# Patient Record
Sex: FEMALE | Race: WHITE | NOT HISPANIC OR LATINO | Employment: FULL TIME | URBAN - METROPOLITAN AREA
[De-identification: names, ages, dates, MRNs, and addresses within clinical notes are randomized per-mention and may not be internally consistent; named-entity substitution may affect disease eponyms.]

---

## 2018-04-13 ENCOUNTER — ANNUAL EXAM (OUTPATIENT)
Dept: OBGYN CLINIC | Facility: CLINIC | Age: 28
End: 2018-04-13
Payer: COMMERCIAL

## 2018-04-13 VITALS
BODY MASS INDEX: 28.82 KG/M2 | SYSTOLIC BLOOD PRESSURE: 138 MMHG | WEIGHT: 173 LBS | HEIGHT: 65 IN | DIASTOLIC BLOOD PRESSURE: 72 MMHG

## 2018-04-13 DIAGNOSIS — Z01.419 WOMEN'S ANNUAL ROUTINE GYNECOLOGICAL EXAMINATION: Primary | ICD-10-CM

## 2018-04-13 PROCEDURE — 99385 PREV VISIT NEW AGE 18-39: CPT | Performed by: NURSE PRACTITIONER

## 2018-04-13 NOTE — PROGRESS NOTES
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[employer]  Her menstrual cycles are regular and predictable  Her current method of contraception includes condoms  She denies issues with intimacy  She denies /GI and Gyn complaints  She denies depression/anxiety  There is a family hx of ovarian cancer in her MGM who passed away at the age of 48 and her Maternal Aunt who was also dx at age 48 and is doing well  Her dental care is up-to-date  Vaccines are up-to-date  She maintains a healthy lifestyle  Gynecologic History    Patient's last menstrual period was 2018  Last Pap: 2016  Results were: normal    Obstetric History    OB History    Para Term  AB Living   0 0 0 0 0 0   SAB TAB Ectopic Multiple Live Births   0 0 0 0 0             The following portions of the patient's history were reviewed and updated as appropriate: allergies, current medications, past family history, past medical history, past social history, past surgical history and problem list     Review of Systems    Pertinent items are noted in HPI  Objective    Physical Exam   Constitutional: Vital signs are normal  She appears well-developed and well-nourished  Genitourinary: Vagina normal and uterus normal  Pelvic exam was performed with patient supine  There is no rash, tenderness, lesion or Bartholin's cyst on the right labia  There is no rash, tenderness, lesion or Bartholin's cyst on the left labia  Right adnexum does not display mass, does not display tenderness and does not display fullness  Left adnexum does not display mass, does not display tenderness and does not display fullness  Cervix is nulliparous  Cervix does not exhibit motion tenderness, lesion, discharge, friability, polyp or nabothian cyst      Uterus is anteverted     HENT:   Head: Normocephalic and atraumatic  Neck: Neck supple  No thyromegaly present  Cardiovascular: Normal rate, regular rhythm, S1 normal, S2 normal and normal heart sounds  Pulmonary/Chest: Effort normal and breath sounds normal  Right breast exhibits no inverted nipple, no mass, no nipple discharge, no skin change and no tenderness  Left breast exhibits no inverted nipple, no mass, no nipple discharge, no skin change and no tenderness  Abdominal: Soft  Normal appearance and bowel sounds are normal  She exhibits no distension and no mass  There is no hepatosplenomegaly  There is no tenderness  Lymphadenopathy:     She has no cervical adenopathy  She has no axillary adenopathy  Neurological: She is alert  Skin: Skin is warm  Psychiatric: She has a normal mood and affect  Nursing note and vitals reviewed  Assessment and Plan    Patient informed of a Stable GYN exam  A pap smear was performed  Patient was informed if her pap is normal she will not receive a call from our office  All questions and concerns addressed and patient verbalized understanding  She is to follow up in 1 year or sooner if needed

## 2018-04-17 LAB
CLINICAL INFO: NORMAL
CYTO CVX: NORMAL
CYTOLOGY CMNT CVX/VAG CYTO-IMP: NORMAL
DATE PREVIOUS BX: NORMAL
LMP START DATE: NORMAL
SL AMB PREV. PAP:: NORMAL
SPECIMEN SOURCE CVX/VAG CYTO: NORMAL

## 2019-04-12 ENCOUNTER — ANNUAL EXAM (OUTPATIENT)
Dept: OBGYN CLINIC | Facility: CLINIC | Age: 29
End: 2019-04-12
Payer: COMMERCIAL

## 2019-04-12 VITALS
SYSTOLIC BLOOD PRESSURE: 120 MMHG | HEIGHT: 65 IN | BODY MASS INDEX: 27.66 KG/M2 | WEIGHT: 166 LBS | DIASTOLIC BLOOD PRESSURE: 74 MMHG

## 2019-04-12 DIAGNOSIS — R53.83 FATIGUE, UNSPECIFIED TYPE: ICD-10-CM

## 2019-04-12 DIAGNOSIS — L68.0 HIRSUTISM: ICD-10-CM

## 2019-04-12 DIAGNOSIS — Z01.419 WOMEN'S ANNUAL ROUTINE GYNECOLOGICAL EXAMINATION: Primary | ICD-10-CM

## 2019-04-12 PROCEDURE — 99395 PREV VISIT EST AGE 18-39: CPT | Performed by: NURSE PRACTITIONER

## 2019-04-12 RX ORDER — MULTIVITAMIN
1 TABLET ORAL DAILY
COMMUNITY
End: 2022-05-16 | Stop reason: ALTCHOICE

## 2019-04-18 ENCOUNTER — TELEPHONE (OUTPATIENT)
Dept: OBGYN CLINIC | Facility: CLINIC | Age: 29
End: 2019-04-18

## 2019-04-18 LAB
17OHP SERPL-MCNC: 150 NG/DL
25(OH)D3 SERPL-MCNC: 39 NG/ML (ref 30–100)
CHOLEST SERPL-MCNC: 164 MG/DL
CHOLEST/HDLC SERPL: 2.6 (CALC)
DHEA-S SERPL-MCNC: 172 MCG/DL (ref 18–391)
GLUCOSE 2H P MEAL SERPL-MCNC: 84 MG/DL
GLUCOSE P FAST SERPL-MCNC: 82 MG/DL (ref 65–99)
HDLC SERPL-MCNC: 63 MG/DL
INSULIN SERPL-ACNC: 4.6 UIU/ML (ref 2–19.6)
LDLC SERPL CALC-MCNC: 87 MG/DL (CALC)
NONHDLC SERPL-MCNC: 101 MG/DL (CALC)
PROLACTIN SERPL-MCNC: 13.4 NG/ML
TESTOST FREE SERPL-MCNC: 5.6 PG/ML (ref 0.1–6.4)
TESTOST SERPL-MCNC: 54 NG/DL (ref 2–45)
TRIGL SERPL-MCNC: 59 MG/DL
TSH SERPL-ACNC: 1.89 MIU/L

## 2019-04-19 ENCOUNTER — TELEPHONE (OUTPATIENT)
Dept: OBGYN CLINIC | Facility: CLINIC | Age: 29
End: 2019-04-19

## 2019-10-23 ENCOUNTER — OFFICE VISIT (OUTPATIENT)
Dept: INTERNAL MEDICINE CLINIC | Facility: CLINIC | Age: 29
End: 2019-10-23
Payer: COMMERCIAL

## 2019-10-23 VITALS
TEMPERATURE: 98.6 F | BODY MASS INDEX: 30.66 KG/M2 | DIASTOLIC BLOOD PRESSURE: 72 MMHG | HEIGHT: 65 IN | WEIGHT: 184 LBS | RESPIRATION RATE: 16 BRPM | SYSTOLIC BLOOD PRESSURE: 126 MMHG | HEART RATE: 64 BPM | OXYGEN SATURATION: 99 %

## 2019-10-23 DIAGNOSIS — Z23 NEED FOR INFLUENZA VACCINATION: Primary | ICD-10-CM

## 2019-10-23 DIAGNOSIS — Z00.00 ENCOUNTER FOR WELL ADULT EXAM WITHOUT ABNORMAL FINDINGS: ICD-10-CM

## 2019-10-23 DIAGNOSIS — J30.2 SEASONAL ALLERGIES: ICD-10-CM

## 2019-10-23 LAB
BUN SERPL-MCNC: 11 MG/DL (ref 7–25)
BUN/CREAT SERPL: NORMAL (CALC) (ref 6–22)
CALCIUM SERPL-MCNC: 9.6 MG/DL (ref 8.6–10.2)
CHLORIDE SERPL-SCNC: 103 MMOL/L (ref 98–110)
CHOLEST SERPL-MCNC: 182 MG/DL
CHOLEST/HDLC SERPL: 3 (CALC)
CO2 SERPL-SCNC: 28 MMOL/L (ref 20–32)
CREAT SERPL-MCNC: 0.75 MG/DL (ref 0.5–1.1)
GLUCOSE SERPL-MCNC: 91 MG/DL (ref 65–99)
HDLC SERPL-MCNC: 60 MG/DL
LDLC SERPL CALC-MCNC: 107 MG/DL (CALC)
NONHDLC SERPL-MCNC: 122 MG/DL (CALC)
POTASSIUM SERPL-SCNC: 4.1 MMOL/L (ref 3.5–5.3)
SL AMB EGFR AFRICAN AMERICAN: 127 ML/MIN/1.73M2
SL AMB EGFR NON AFRICAN AMERICAN: 109 ML/MIN/1.73M2
SODIUM SERPL-SCNC: 137 MMOL/L (ref 135–146)
TRIGL SERPL-MCNC: 61 MG/DL

## 2019-10-23 PROCEDURE — 99385 PREV VISIT NEW AGE 18-39: CPT | Performed by: INTERNAL MEDICINE

## 2019-10-23 RX ORDER — FEXOFENADINE HCL 180 MG/1
180 TABLET ORAL DAILY
COMMUNITY
End: 2022-05-16

## 2019-10-23 NOTE — PROGRESS NOTES
Body mass index is 30 39 kg/m²  Assessment/Plan:    Encounter for well adult exam without abnormal findings  Pt is here to establish care and perform a physical for her new job  ROS and physical exam findings were normal  A lipid panel and basic metabolic panel were ordered  We will contact the patient once the forms are completed  BMI 30 0-30 9,adult  Will need follow-up regarding dietary recommendations and need to increase physical activity for weight control    Seasonal allergies  Currently stable with over-the-counter medication  Diagnoses and all orders for this visit:    Need for influenza vaccination  -     influenza vaccine, [id number], quadrivalent, 0 5 mL, preservative-free, for adult and pediatric patients 6 mos+ (AFLURIA, FLUARIX, FLULAVAL, FLUZONE)  -     Basic metabolic panel; Future  -     Lipid panel; Future    BMI 30 0-30 9,adult    Encounter for well adult exam without abnormal findings    Seasonal allergies    Other orders  -     Probiotic Product (PROBIOTIC DAILY PO); Take 1 tablet by mouth daily as needed  -     fexofenadine (ALLEGRA) 180 MG tablet; Take 180 mg by mouth daily               Subjective:   Pt is a 33 yo young woman here to establish care and fill out forms for her new job  She has no complaints at the moment  Patient ID: Chandler Molina is a 34 y o  female  Pt is a 33 yo female who came to establish care and to fill out a physical form for her new job  She appears well and has no complaints  She does have some congestion from her seasonal allergies but they are well controlled with allegra  She visits her gynecologist regularly and all was well in her last visit  A lipid panel and basic metabolic panel were ordered for her physical form  Pt will be contacted when the form is filled         Family History   Problem Relation Age of Onset    Anxiety disorder Mother     Hypertension Mother     Depression Mother     Hypertension Father     Gout Father  Ovarian cancer Maternal Grandmother          at 48    Cancer Maternal Grandmother     Ovarian cancer Maternal Aunt 48    Esophageal cancer Maternal Grandfather      Social History     Socioeconomic History    Marital status: Single     Spouse name: Not on file    Number of children: Not on file    Years of education: Not on file    Highest education level: Not on file   Occupational History    Not on file   Social Needs    Financial resource strain: Not on file    Food insecurity:     Worry: Not on file     Inability: Not on file    Transportation needs:     Medical: Not on file     Non-medical: Not on file   Tobacco Use    Smoking status: Never Smoker    Smokeless tobacco: Never Used   Substance and Sexual Activity    Alcohol use:  Yes     Alcohol/week: 2 0 standard drinks     Types: 1 Glasses of wine, 1 Cans of beer per week     Frequency: 2-4 times a month     Drinks per session: 1 or 2     Binge frequency: Never     Comment: social    Drug use: No    Sexual activity: Yes     Partners: Male     Birth control/protection: Condom Male   Lifestyle    Physical activity:     Days per week: Not on file     Minutes per session: Not on file    Stress: Not on file   Relationships    Social connections:     Talks on phone: Not on file     Gets together: Not on file     Attends Baptism service: Not on file     Active member of club or organization: Not on file     Attends meetings of clubs or organizations: Not on file     Relationship status: Not on file    Intimate partner violence:     Fear of current or ex partner: Not on file     Emotionally abused: Not on file     Physically abused: Not on file     Forced sexual activity: Not on file   Other Topics Concern    Not on file   Social History Narrative    Not on file     Past Medical History:   Diagnosis Date    Acne     Allergic     seasonal       Current Outpatient Medications:     fexofenadine (ALLEGRA) 180 MG tablet, Take 180 mg by mouth daily, Disp: , Rfl:     Multiple Vitamin (MULTIVITAMIN) tablet, Take 1 tablet by mouth daily, Disp: , Rfl:     Probiotic Product (PROBIOTIC DAILY PO), Take 1 tablet by mouth daily as needed, Disp: , Rfl:   No Known Allergies  Past Surgical History:   Procedure Laterality Date    WISDOM TOOTH EXTRACTION      WISDOM TOOTH EXTRACTION           Review of Systems   Constitutional: Negative for chills, diaphoresis, fatigue, fever and unexpected weight change  HENT: Positive for congestion  Negative for ear discharge, ear pain, facial swelling, hearing loss, postnasal drip, rhinorrhea, sinus pressure, sinus pain, sneezing, sore throat, tinnitus and trouble swallowing  Pt has congestion from seasonal allergies, which she takes allegra   Eyes: Negative for photophobia, pain, discharge, redness, itching and visual disturbance  Respiratory: Negative for cough, chest tightness and shortness of breath  Cardiovascular: Negative for chest pain, palpitations and leg swelling  Gastrointestinal: Positive for constipation  Negative for abdominal distention, abdominal pain, blood in stool, diarrhea, nausea and vomiting  Pt takes probiotics for her constipation, which provides relief   Endocrine: Negative for cold intolerance and heat intolerance  Genitourinary: Negative for decreased urine volume, difficulty urinating, dysuria, flank pain, frequency, hematuria, menstrual problem, pelvic pain, urgency, vaginal bleeding, vaginal discharge and vaginal pain  Musculoskeletal: Negative for arthralgias, back pain, gait problem, joint swelling and myalgias  Skin: Negative for color change  Allergic/Immunologic: Positive for environmental allergies  Negative for food allergies  Pt has seasonal allergies and is allergic to cats   Neurological: Negative for dizziness, tremors, seizures, syncope, weakness, light-headedness and headaches     Psychiatric/Behavioral: Negative for agitation and behavioral problems  Past Medical History:   Diagnosis Date    Acne     Allergic     seasonal         Current Outpatient Medications:     fexofenadine (ALLEGRA) 180 MG tablet, Take 180 mg by mouth daily, Disp: , Rfl:     Multiple Vitamin (MULTIVITAMIN) tablet, Take 1 tablet by mouth daily, Disp: , Rfl:     Probiotic Product (PROBIOTIC DAILY PO), Take 1 tablet by mouth daily as needed, Disp: , Rfl:     No Known Allergies    Social History   Past Surgical History:   Procedure Laterality Date    WISDOM TOOTH EXTRACTION      WISDOM TOOTH EXTRACTION       Family History   Problem Relation Age of Onset    Anxiety disorder Mother     Hypertension Mother     Depression Mother     Hypertension Father     Gout Father     Ovarian cancer Maternal Grandmother          at 48    Cancer Maternal Grandmother     Ovarian cancer Maternal Aunt 48    Esophageal cancer Maternal Grandfather        Objective:  /72 (BP Location: Left arm, Patient Position: Sitting, Cuff Size: Adult)   Pulse 64   Temp 98 6 °F (37 °C) (Oral)   Resp 16   Ht 5' 5 25" (1 657 m)   Wt 83 5 kg (184 lb)   SpO2 99% Comment: Room air  BMI 30 39 kg/m²   Body mass index is 30 39 kg/m²  BMI Counseling: Body mass index is 30 39 kg/m²  The BMI is above normal  Nutrition recommendations include reducing portion sizes, decreasing overall calorie intake, 3-5 servings of fruits/vegetables daily, reducing fast food intake, consuming healthier snacks, decreasing soda and/or juice intake, moderation in carbohydrate intake, increasing intake of lean protein, reducing intake of saturated fat and trans fat and reducing intake of cholesterol  Exercise recommendations include exercising 3-5 times per week and joining a gym  Physical Exam   Constitutional: She is oriented to person, place, and time  She appears well-developed and well-nourished  No distress  HENT:   Head: Normocephalic     Eyes: Pupils are equal, round, and reactive to light  Right eye exhibits no discharge  Left eye exhibits no discharge  No scleral icterus  Neck: Normal range of motion  Cardiovascular: Normal rate, regular rhythm, normal heart sounds and intact distal pulses  Pulmonary/Chest: Effort normal and breath sounds normal    Abdominal: Soft  Bowel sounds are normal    Musculoskeletal: Normal range of motion  Neurological: She is alert and oriented to person, place, and time  Skin: Skin is warm  She is not diaphoretic  Psychiatric: She has a normal mood and affect   Her behavior is normal

## 2019-10-23 NOTE — ASSESSMENT & PLAN NOTE
Will need follow-up regarding dietary recommendations and need to increase physical activity for weight control

## 2019-10-23 NOTE — ASSESSMENT & PLAN NOTE
Pt is here to establish care and perform a physical for her new job  ROS and physical exam findings were normal  A lipid panel and basic metabolic panel were ordered  We will contact the patient once the forms are completed

## 2019-10-30 ENCOUNTER — TELEPHONE (OUTPATIENT)
Dept: INTERNAL MEDICINE CLINIC | Facility: CLINIC | Age: 29
End: 2019-10-30

## 2019-10-30 NOTE — TELEPHONE ENCOUNTER
Pt had blood work done and left a form to be filled out once the blood work did come in titled Healthy blue reward  Are we able to look into this and then give the pt a call back with the results?

## 2020-06-15 ENCOUNTER — ANNUAL EXAM (OUTPATIENT)
Dept: OBGYN CLINIC | Facility: CLINIC | Age: 30
End: 2020-06-15
Payer: COMMERCIAL

## 2020-06-15 VITALS
WEIGHT: 183 LBS | HEIGHT: 65 IN | BODY MASS INDEX: 30.49 KG/M2 | DIASTOLIC BLOOD PRESSURE: 74 MMHG | SYSTOLIC BLOOD PRESSURE: 122 MMHG

## 2020-06-15 DIAGNOSIS — Z01.419 WOMEN'S ANNUAL ROUTINE GYNECOLOGICAL EXAMINATION: Primary | ICD-10-CM

## 2020-06-15 PROCEDURE — S0612 ANNUAL GYNECOLOGICAL EXAMINA: HCPCS | Performed by: NURSE PRACTITIONER

## 2020-12-18 ENCOUNTER — TELEPHONE (OUTPATIENT)
Dept: OBGYN CLINIC | Facility: CLINIC | Age: 30
End: 2020-12-18

## 2021-01-04 ENCOUNTER — INITIAL PRENATAL (OUTPATIENT)
Dept: OBGYN CLINIC | Facility: CLINIC | Age: 31
End: 2021-01-04

## 2021-01-04 VITALS
HEIGHT: 65 IN | BODY MASS INDEX: 31.09 KG/M2 | SYSTOLIC BLOOD PRESSURE: 134 MMHG | WEIGHT: 186.6 LBS | DIASTOLIC BLOOD PRESSURE: 70 MMHG

## 2021-01-04 DIAGNOSIS — Z34.01 ENCOUNTER FOR SUPERVISION OF NORMAL FIRST PREGNANCY IN FIRST TRIMESTER: Primary | ICD-10-CM

## 2021-01-04 DIAGNOSIS — Z36.89 ENCOUNTER FOR OTHER SPECIFIED ANTENATAL SCREENING: ICD-10-CM

## 2021-01-04 DIAGNOSIS — Z3A.08 8 WEEKS GESTATION OF PREGNANCY: ICD-10-CM

## 2021-01-04 PROCEDURE — OBC: Performed by: OBSTETRICS & GYNECOLOGY

## 2021-01-04 NOTE — PROGRESS NOTES
INITIAL PRENATAL NURSE APPT:      Unplanned pregnancy - no birth control x 1 1/2 yrs  Taking prenatal vits w/ dha  LMP 2020 - 8 3/7 wk - [facility] 2021  Cycles q 34-36 days  Had yearly 2020 - last pap 2018  No travel plans  Pt had flu vaccine 10/2020  No hx MRSA  Pt works as physical therapist (long-term care)  Undecided about getting COVID vaccine - recom per CDC  Discussed nutrition (will increase dietary calcium & take supp), SQS testing, CF testing, Level 2 U/S, TDAP @ 28 wk  (+) nausea, no vomiting, (+) urinary frequency  Initial prenatal labs + 1 hr glucose (increased BMI) ordered ([facility])

## 2021-01-11 LAB
ABO GROUP BLD: NORMAL
APPEARANCE UR: CLEAR
BACTERIA UR QL AUTO: NORMAL /HPF
BASOPHILS # BLD AUTO: 50 CELLS/UL (ref 0–200)
BASOPHILS NFR BLD AUTO: 0.7 %
BILIRUB UR QL STRIP: NEGATIVE
BLD GP AB SCN SERPL QL: NORMAL
COLOR UR: YELLOW
EOSINOPHIL # BLD AUTO: 58 CELLS/UL (ref 15–500)
EOSINOPHIL NFR BLD AUTO: 0.8 %
ERYTHROCYTE [DISTWIDTH] IN BLOOD BY AUTOMATED COUNT: 12.5 % (ref 11–15)
GLUCOSE 1H P 50 G GLC PO SERPL-MCNC: 77 MG/DL
GLUCOSE UR QL STRIP: NEGATIVE
HBV SURFACE AG SERPL QL IA: NORMAL
HCT VFR BLD AUTO: 38.2 % (ref 35–45)
HGB BLD-MCNC: 12.6 G/DL (ref 11.7–15.5)
HGB UR QL STRIP: NEGATIVE
HIV 1+2 AB+HIV1 P24 AG SERPL QL IA: NORMAL
HYALINE CASTS #/AREA URNS LPF: NORMAL /LPF
KETONES UR QL STRIP: NEGATIVE
LEUKOCYTE ESTERASE UR QL STRIP: NEGATIVE
LYMPHOCYTES # BLD AUTO: 1332 CELLS/UL (ref 850–3900)
LYMPHOCYTES NFR BLD AUTO: 18.5 %
MCH RBC QN AUTO: 30.5 PG (ref 27–33)
MCHC RBC AUTO-ENTMCNC: 33 G/DL (ref 32–36)
MCV RBC AUTO: 92.5 FL (ref 80–100)
MONOCYTES # BLD AUTO: 734 CELLS/UL (ref 200–950)
MONOCYTES NFR BLD AUTO: 10.2 %
NEUTROPHILS # BLD AUTO: 5026 CELLS/UL (ref 1500–7800)
NEUTROPHILS NFR BLD AUTO: 69.8 %
NITRITE UR QL STRIP: NEGATIVE
PH UR STRIP: 7.5 [PH] (ref 5–8)
PLATELET # BLD AUTO: 300 THOUSAND/UL (ref 140–400)
PMV BLD REES-ECKER: 9.8 FL (ref 7.5–12.5)
PROT UR QL STRIP: NEGATIVE
RBC # BLD AUTO: 4.13 MILLION/UL (ref 3.8–5.1)
RBC #/AREA URNS HPF: NORMAL /HPF
RH BLD: NORMAL
RPR SER QL: NORMAL
RUBV IGG SERPL IA-ACNC: 1.9 INDEX
SP GR UR STRIP: 1.01 (ref 1–1.03)
SQUAMOUS #/AREA URNS HPF: NORMAL /HPF
WBC # BLD AUTO: 7.2 THOUSAND/UL (ref 3.8–10.8)
WBC #/AREA URNS HPF: NORMAL /HPF

## 2021-01-18 ENCOUNTER — ROUTINE PRENATAL (OUTPATIENT)
Dept: OBGYN CLINIC | Facility: CLINIC | Age: 31
End: 2021-01-18

## 2021-01-18 VITALS
HEIGHT: 65 IN | BODY MASS INDEX: 31.32 KG/M2 | WEIGHT: 188 LBS | DIASTOLIC BLOOD PRESSURE: 78 MMHG | SYSTOLIC BLOOD PRESSURE: 124 MMHG

## 2021-01-18 DIAGNOSIS — Z36.89 ENCOUNTER FOR OTHER SPECIFIED ANTENATAL SCREENING: Primary | ICD-10-CM

## 2021-01-18 PROCEDURE — 87070 CULTURE OTHR SPECIMN AEROBIC: CPT | Performed by: NURSE PRACTITIONER

## 2021-01-18 PROCEDURE — G0145 SCR C/V CYTO,THINLAYER,RESCR: HCPCS | Performed by: NURSE PRACTITIONER

## 2021-01-18 PROCEDURE — 87491 CHLMYD TRACH DNA AMP PROBE: CPT | Performed by: NURSE PRACTITIONER

## 2021-01-18 PROCEDURE — PNV: Performed by: NURSE PRACTITIONER

## 2021-01-18 PROCEDURE — 87591 N.GONORRHOEAE DNA AMP PROB: CPT | Performed by: NURSE PRACTITIONER

## 2021-01-18 NOTE — PROGRESS NOTES
Assessment/Plan:       Diagnoses and all orders for this visit:    Encounter for other specified  screening  -     Liquid-based pap, screening  -     Chlamydia/GC amplified DNA by PCR  -     Genital Comprehensive Culture          RTO in 4 weeks  Subjective:      Patient ID: Darion Calderon is a 31 y o  female   Here for OB physical  This was an unplanned pregnancy  HPI    The following portions of the patient's history were reviewed and updated as appropriate: allergies, current medications, past family history, past medical history, past social history, past surgical history and problem list     Review of Systems      Objective:      /78   Ht 5' 5" (1 651 m)   Wt 85 3 kg (188 lb)   LMP 2020   BMI 31 28 kg/m²        Physical Exam      See OB physical under episode  Prenatal course of care reviewed with patient  Transabdominal US confirms a viable IUP measuring  9 weeks  Size less than dates  ROSAURA based on US - 21  Patient will arrange for genetic screening with  center  Initial prenatal labs reviewed  Pap smear and cultures obtained  JSW in for delivery consent signature  All questions and concerns addressed and patient verbalized understanding

## 2021-01-21 LAB
LAB AP GYN PRIMARY INTERPRETATION: NORMAL
LAB AP LMP: NORMAL
Lab: NORMAL

## 2021-01-22 LAB — BACTERIA GENITAL AEROBE CULT: NORMAL

## 2021-01-25 LAB
C TRACH DNA SPEC QL NAA+PROBE: NEGATIVE
N GONORRHOEA DNA SPEC QL NAA+PROBE: NEGATIVE

## 2021-02-05 ENCOUNTER — TELEPHONE (OUTPATIENT)
Dept: INTERNAL MEDICINE CLINIC | Facility: CLINIC | Age: 31
End: 2021-02-05

## 2021-02-05 ENCOUNTER — TELEPHONE (OUTPATIENT)
Dept: PERINATAL CARE | Facility: CLINIC | Age: 31
End: 2021-02-05

## 2021-02-05 NOTE — TELEPHONE ENCOUNTER
-------------------------------------------------------------    Attempted to reach patient by phone and left voicemail to confirm appointment for MFM ultrasound  1 support person ( must be over the age of 15) may accompany you for your appointment  If you or your support person have traveled outside the state in the past 2 weeks, please call and notify our office today #[phone]  You and your support person must wear a mask ,covering nose and mouth,during your entire visit  To minimize your exposure in our waiting room, please call our office prior to entering the building  Check in and rooming questions will be done via phone  We will give you directions when to enter for your appointment  Radha: [phone]    IF you are not feeling well- cough, fever, shortness of breath or any flu like symptoms, contact your primary care physician or [facility]  If you are awaiting COVID 19 test results please call and reschedule your appointment    Any questions with these instructions please call Maternal Fetal Medicine nurse line today @ # [phone]

## 2021-02-05 NOTE — TELEPHONE ENCOUNTER
Left message on machine for patient to call me at [place] office  patient seen once for a physical 10/23/19      Pt overdue for another physical with Dr Veronica Hernandez

## 2021-02-08 ENCOUNTER — ROUTINE PRENATAL (OUTPATIENT)
Dept: PERINATAL CARE | Facility: CLINIC | Age: 31
End: 2021-02-08
Payer: COMMERCIAL

## 2021-02-08 VITALS
HEART RATE: 80 BPM | HEIGHT: 65 IN | BODY MASS INDEX: 31.82 KG/M2 | DIASTOLIC BLOOD PRESSURE: 70 MMHG | SYSTOLIC BLOOD PRESSURE: 125 MMHG | WEIGHT: 191 LBS

## 2021-02-08 DIAGNOSIS — Z36.82 ENCOUNTER FOR ANTENATAL SCREENING FOR NUCHAL TRANSLUCENCY: Primary | ICD-10-CM

## 2021-02-08 DIAGNOSIS — Z3A.12 12 WEEKS GESTATION OF PREGNANCY: ICD-10-CM

## 2021-02-08 PROCEDURE — 76813 OB US NUCHAL MEAS 1 GEST: CPT | Performed by: OBSTETRICS & GYNECOLOGY

## 2021-02-08 PROCEDURE — 99203 OFFICE O/P NEW LOW 30 MIN: CPT | Performed by: OBSTETRICS & GYNECOLOGY

## 2021-02-08 PROCEDURE — 1036F TOBACCO NON-USER: CPT | Performed by: OBSTETRICS & GYNECOLOGY

## 2021-02-08 RX ORDER — ASPIRIN 81 MG/1
162 TABLET ORAL DAILY
Qty: 180 TABLET | Refills: 0 | Status: SHIPPED | OUTPATIENT
Start: 2021-02-08 | End: 2022-05-16 | Stop reason: ALTCHOICE

## 2021-02-08 NOTE — PATIENT INSTRUCTIONS
Thank you for choosing us for your  care today  If you have any questions about your ultrasound or care, please do not hesitate to contact us or your primary obstetrician  Please begin taking aspirin 162mg daily (two of the 81mg tablets) for the prevention of preeclampsia  If you have asthma and notice an increase in symptom frequency or severity, consider stopping this medication  Some general instructions for your pregnancy are:     Protect against coronavirus: Pregnant women are increased risk of severe COVID  Continue to practice social distancing, wear a mask, and wash your hands often  Because of the increased risk of pregnancy, you are advised to not attend or host inperson gatherings with people who do not currently live inside your household - this includes birthday parties, gender reveals, baby showers, etc)  Notify your primary care doctor if you have any symptoms including cough, shortness of breath or difficulty breathing, fever, chills, muscle pain, sore throat, or loss of taste or smell  Pregnant women can receive the coronavirus vaccine   Exercise: Aim for 22 minutes per day (150 minutes per week) of regular exercise  Walking is great!  Nutrition: aim for calcium-rich and iron-rich foods as well as healthy sources of protein   Protect against the flu: get yourself and your entire household vaccinated against influenza  This will protect your baby   Learn about Preeclampsia: preeclampsia is a common, serious high blood pressure complication in pregnancy  A blood pressure of 086EAJB (systolic or top number) or 47OETS (diastolic or bottom number) is not normal and needs evaluation by your doctor   If you smoke, try to reduce how many cigarettes you smoke or quit completely  Do not vape       Other warning signs to watch out for in pregnancy or postpartum: chest pain, obstructed breathing or shortness of breath, seizures, thoughts of hurting yourself or your baby, bleeding, a painful or swollen leg, fever, or headache (see AWHONN POST-BIRTH Warning Signs campaign)  If these happen call 911  Itching is also not normal in pregnancy and if you experience this, especially over your hands and feet, potentially worse at night, notify your doctors   Lastly, if you are contacted regarding participation in a survey about your experience in our office, please know that we take any feedback you provide seriously and use it to improve how we deliver care through our center

## 2021-02-08 NOTE — PROGRESS NOTES
[address]: Ms Cosme Abernathy was seen today at 12w0d for nuchal translucency ultrasound  See ultrasound report under "OB Procedures" tab  Please don't hesitate to contact our office with any concerns or questions    Fiorella Diaz MD

## 2021-02-08 NOTE — LETTER
February 8, 2021     Missy Méndez MD  [address]  [address]  [address] [place] [address]    Patient: Kasandra Felton   YOB: 1990   Date of Visit: 2/8/2021       Dear Dr Jareth Guevara: Thank you for referring Wilfrid Aleman to me for evaluation  Below are my notes for this consultation  If you have questions, please do not hesitate to call me  I look forward to following your patient along with you  Sincerely,        Nubia Machado MD        CC: No Recipients  Nubia Machado MD  2/8/2021  7:55 AM  Sign when Signing Visit  [id number] [place] [address] Road: Ms Bhumika Hare was seen today at 12w0d for nuchal translucency ultrasound  See ultrasound report under "OB Procedures" tab  Please don't hesitate to contact our office with any concerns or questions    Nubia Machado MD

## 2021-02-08 NOTE — PROGRESS NOTES
Pt chose to have Step wise test done, I gave her the form with explanation which she verbalized understandings

## 2021-02-15 ENCOUNTER — ROUTINE PRENATAL (OUTPATIENT)
Dept: OBGYN CLINIC | Facility: CLINIC | Age: 31
End: 2021-02-15

## 2021-02-15 VITALS — BODY MASS INDEX: 31.48 KG/M2 | WEIGHT: 189.2 LBS | SYSTOLIC BLOOD PRESSURE: 110 MMHG | DIASTOLIC BLOOD PRESSURE: 70 MMHG

## 2021-02-15 DIAGNOSIS — Z34.81 PRENATAL CARE, SUBSEQUENT PREGNANCY, FIRST TRIMESTER: Primary | ICD-10-CM

## 2021-02-15 PROCEDURE — PNV: Performed by: OBSTETRICS & GYNECOLOGY

## 2021-02-15 NOTE — PROGRESS NOTES
The patient had her lab work drawn for sequential screening test today, results are pending  No other concerns or issues today

## 2021-02-15 NOTE — PATIENT INSTRUCTIONS
No issues or complaints lab work drawn today for the 1st part sequential screening test   Will keep her EDC  Return to office 4 weeks

## 2021-02-18 LAB
# FETUSES US: 1
AGE: NORMAL
B-HCG ADJ MOM SERPL: 1.88
B-HCG SERPL-ACNC: 117.3 IU/ML
COLLECT DATE: NORMAL
CURRENT SMOKER: NORMAL
DONATED EGG PATIENT QL: NORMAL
FET CRL US.MEAS: 67 MM
FET CRL US.MEAS: NORMAL MM
FET NUCHAL FOLD MOM THICKNESS US.MEAS: 0.89
FET NUCHAL FOLD THICKNESS US.MEAS: 1.4 MM
FET NUCHAL FOLD THICKNESS US.MEAS: NORMAL MM
FET TS 21 RISK FROM MAT AGE: NORMAL
GA CLIN EST: 13.9 WK
GA METHOD: NORMAL
HX OF NTD NARR: NORMAL
HX OF TRISOMY 21 NARR: NORMAL
IDDM PATIENT QL: NORMAL
INTEGRATED SCN PATIENT-IMP: NORMAL
PAPP-A MOM SERPL: 1.03
PAPP-A SERPL-MCNC: 1264.2 NG/ML
PHYSICIAN NPI: NORMAL
SL AMB NASAL BONE: PRESENT
SL AMB NTQR LOCATION ID#: NORMAL
SL AMB NTQR READING PHYS ID#: NORMAL
SL AMB REFERRING PHYSICIAN NAME: NORMAL
SL AMB REFERRING PHYSICIAN PHONE: NORMAL
SL AMB REPEAT SPECIMEN: NORMAL
SL AMB TWIN B NASAL BONE: NORMAL
SONOGRAPHER NAME: NORMAL
SONOGRAPHER: NORMAL
SONOGRAPHER: NORMAL
TS 18 RISK FETUS: NORMAL
TS 21 RISK FETUS: NORMAL
US DATE: NORMAL
US FETUSES STUDY [IMP]: NORMAL

## 2021-02-19 ENCOUNTER — TELEPHONE (OUTPATIENT)
Dept: PERINATAL CARE | Facility: OTHER | Age: 31
End: 2021-02-19

## 2021-02-19 NOTE — TELEPHONE ENCOUNTER
----- Message from Crystal Lucero MD sent at 2021  8:13 AM EST -----  Hi  RN staff, I've reviewed this Sequential Part 1 result which shows low preliminary risk for the conditions tested (trisomies 21 and 18), can you call her to inform her of this result? Please also mail her the requisition form for the Sequential Screen Part 2  Thank you    Crystal Lucero MD

## 2021-02-19 NOTE — LETTER
02/19/21  Cesar Page  1990    Thank you for completing Part 1 of your Sequential Screen  To obtain a complete test result, please complete blood work for Part 2 Sequential Screen between the weeks of 2/23 to 3/08, 2021 (BEST RESULT)                                                                                                 Last day 4/19/2021    Based on your insurance coverage, please use one of the following locations  The other option is to go to [email or web address]  Call our office for any questions at [phone]          Sincerely,    Jeannine Velez RN

## 2021-03-10 LAB
# FETUSES US: 1
AFP ADJ MOM SERPL: 1.23
AFP SERPL-MCNC: 37.4 NG/ML
B-HCG ADJ MOM SERPL: 1.94
B-HCG SERPL-ACNC: 58.7 IU/ML
COLLECT DATE: NORMAL
CURRENT SMOKER: NORMAL
FET CRL US.MEAS: 67 MM
FET NUCHAL FOLD MOM THICKNESS US.MEAS: 0.89
FET NUCHAL FOLD THICKNESS US.MEAS: 1.4 MM
FET TS 21 RISK FROM MAT AGE: NORMAL
GA CLIN EST: 16.3 WK
HX OF NTD NARR: NORMAL
IDDM PATIENT QL: NORMAL
INHIBIN A ADJ MOM SERPL: 1.05
INHIBIN A SERPL-MCNC: 154 PG/ML
INTEGRATED SCN PATIENT-IMP: NORMAL
NEURAL TUBE DEFECT RISK FETUS: NORMAL %
PAPP-A MOM SERPL: 1.03
PAPP-A SERPL-MCNC: 1264.2 NG/ML
PHYSICIAN NPI: NORMAL
SL AMB NASAL BONE: PRESENT
SL AMB REFERRING PHYSICIAN NAME: NORMAL
SL AMB REFERRING PHYSICIAN PHONE: NORMAL
SL AMB REPEAT SPECIMEN: NORMAL
SL AMB SPECIMEN # FROM PART 1: NORMAL
SL AMB TWIN B NASAL BONE: NORMAL
TS 18 RISK FETUS: NORMAL
TS 21 RISK FETUS: NORMAL
U ESTRIOL ADJ MOM SERPL: 1.18
U ESTRIOL SERPL-MCNC: 0.85 NG/ML
US DATE: NORMAL

## 2021-03-11 ENCOUNTER — TELEPHONE (OUTPATIENT)
Dept: PERINATAL CARE | Facility: OTHER | Age: 31
End: 2021-03-11

## 2021-03-11 NOTE — TELEPHONE ENCOUNTER
Called patient and gave her the results of her part 2 sequential screen  She offers no questions or concerns at this time

## 2021-03-11 NOTE — TELEPHONE ENCOUNTER
----- Message from Hui Navarro MD sent at 3/11/2021  2:42 PM EST -----  Hi  RN staff, I've reviewed this Sequential Part 2 result which shows low residual risk for the conditions tested (trisomies 21 and 18 and open neural tube defects), can you call her to inform her of this result? Thank you    Hui Navarro MD

## 2021-03-15 ENCOUNTER — ROUTINE PRENATAL (OUTPATIENT)
Dept: OBGYN CLINIC | Facility: CLINIC | Age: 31
End: 2021-03-15

## 2021-03-15 VITALS
WEIGHT: 192 LBS | DIASTOLIC BLOOD PRESSURE: 74 MMHG | SYSTOLIC BLOOD PRESSURE: 116 MMHG | HEIGHT: 65 IN | BODY MASS INDEX: 31.99 KG/M2

## 2021-03-15 DIAGNOSIS — Z34.02 ENCOUNTER FOR SUPERVISION OF NORMAL FIRST PREGNANCY IN SECOND TRIMESTER: Primary | ICD-10-CM

## 2021-03-15 PROCEDURE — PNV: Performed by: NURSE PRACTITIONER

## 2021-03-15 NOTE — PROGRESS NOTES
Moe Mchugh is doing well  Denies LOF/Bleeding/Cramping  Feeling flutters occasionally  Genetic screening negative  RTO in 4 weeks

## 2021-03-29 ENCOUNTER — ROUTINE PRENATAL (OUTPATIENT)
Dept: PERINATAL CARE | Facility: CLINIC | Age: 31
End: 2021-03-29
Payer: COMMERCIAL

## 2021-03-29 ENCOUNTER — TELEPHONE (OUTPATIENT)
Dept: OBGYN CLINIC | Facility: CLINIC | Age: 31
End: 2021-03-29

## 2021-03-29 VITALS
HEIGHT: 65 IN | WEIGHT: 193.6 LBS | SYSTOLIC BLOOD PRESSURE: 127 MMHG | BODY MASS INDEX: 32.26 KG/M2 | HEART RATE: 86 BPM | DIASTOLIC BLOOD PRESSURE: 60 MMHG

## 2021-03-29 DIAGNOSIS — Z36.3 ENCOUNTER FOR ANTENATAL SCREENING FOR MALFORMATIONS: Primary | ICD-10-CM

## 2021-03-29 DIAGNOSIS — Z36.86 ENCOUNTER FOR ANTENATAL SCREENING FOR CERVICAL LENGTH: ICD-10-CM

## 2021-03-29 PROCEDURE — 76817 TRANSVAGINAL US OBSTETRIC: CPT | Performed by: OBSTETRICS & GYNECOLOGY

## 2021-03-29 PROCEDURE — 3008F BODY MASS INDEX DOCD: CPT | Performed by: OBSTETRICS & GYNECOLOGY

## 2021-03-29 PROCEDURE — 76805 OB US >/= 14 WKS SNGL FETUS: CPT | Performed by: OBSTETRICS & GYNECOLOGY

## 2021-03-29 PROCEDURE — 99213 OFFICE O/P EST LOW 20 MIN: CPT | Performed by: OBSTETRICS & GYNECOLOGY

## 2021-03-29 NOTE — PATIENT INSTRUCTIONS
Thank you for choosing us for your  care today  If you have any questions about your ultrasound or care, please do not hesitate to contact us or your primary obstetrician  Some general instructions for your pregnancy are:     Protect against coronavirus: Continue to practice social distancing, wear a mask, and wash your hands often  Pregnant women are increased risk of severe COVID  Notify your primary care doctor if you have any symptoms including cough, shortness of breath or difficulty breathing, fever, chills, muscle pain, sore throat, or loss of taste or smell  Pregnant women can receive the coronavirus vaccine   Exercise: Aim for 22 minutes per day (150 minutes per week) of regular exercise  Walking is great!  Nutrition: aim for calcium-rich and iron-rich foods as well as healthy sources of protein   Protect against the flu: get yourself and your entire household vaccinated against influenza  This will protect your baby   Learn about Preeclampsia: preeclampsia is a common, serious high blood pressure complication in pregnancy  A blood pressure of 846KYBO (systolic or top number) or 09OIOL (diastolic or bottom number) is not normal and needs evaluation by your doctor   If you smoke, try to reduce how many cigarettes you smoke or try to quit completely  Do not vape   Other warning signs to watch out for in pregnancy or postpartum: chest pain, obstructed breathing or shortness of breath, seizures, thoughts of hurting yourself or your baby, bleeding, a painful or swollen leg, fever, or headache (see AWHONN POST-BIRTH Warning Signs campaign)  If these happen call 911  Itching is also not normal in pregnancy and if you experience this, especially over your hands and feet, potentially worse at night, notify your doctors     Lastly, if you are contacted regarding participation in a survey about your experience in our office, please know that we take any feedback you provide seriously and use it to improve how we deliver care through our center

## 2021-03-29 NOTE — PROGRESS NOTES
[address]: Ms Joy Bass was seen today at 19w0d for anatomic survey and cervical length screening ultrasound  See ultrasound report under "OB Procedures" tab  Please don't hesitate to contact our office with any concerns or questions    Riri Abad MD

## 2021-03-29 NOTE — TELEPHONE ENCOUNTER
Last HA1C 9, repeat pending  AccuChecks AC/HS with SSI  '   OB - 19 wk - pt having h/a 2 times/wk past 3 wks, no visual disturbances, no nausea or light sensitivity  BP @ work 120's/60's  Has h/a today - BP = 127/60  Will susienida ES tylenol as she has not taken any med for h/a  Will recall office 3/30/2021 to update & BP reading  Next appt here 4/12/2021  Seen @ [facility] today

## 2021-03-29 NOTE — PROGRESS NOTES
Ultrasound Probe Disinfection    A transvaginal ultrasound was performed  Prior to use, disinfection was performed with High Level Disinfection Process ([employer])  Probe serial number [id number]: [id number] was used        [facility]  03/29/21  8:04 AM

## 2021-03-30 NOTE — TELEPHONE ENCOUNTER
Pt calling with update - h/a lessened with taking ES Tylenol x 1 dose, still some pressure  BP this am @ work = 106/59, 99/57  recom to increase fluids, can take Tylenol x 24 hrs consistently if h/a lingering, recall office prn

## 2021-04-12 ENCOUNTER — ROUTINE PRENATAL (OUTPATIENT)
Dept: OBGYN CLINIC | Facility: CLINIC | Age: 31
End: 2021-04-12

## 2021-04-12 VITALS — BODY MASS INDEX: 33.22 KG/M2 | WEIGHT: 199.6 LBS | DIASTOLIC BLOOD PRESSURE: 70 MMHG | SYSTOLIC BLOOD PRESSURE: 112 MMHG

## 2021-04-12 DIAGNOSIS — Z34.82 PRENATAL CARE, SUBSEQUENT PREGNANCY, SECOND TRIMESTER: Primary | ICD-10-CM

## 2021-04-12 PROCEDURE — PNV: Performed by: OBSTETRICS & GYNECOLOGY

## 2021-05-03 ENCOUNTER — ULTRASOUND (OUTPATIENT)
Dept: PERINATAL CARE | Facility: CLINIC | Age: 31
End: 2021-05-03
Payer: COMMERCIAL

## 2021-05-03 VITALS
WEIGHT: 200.2 LBS | HEIGHT: 65 IN | DIASTOLIC BLOOD PRESSURE: 64 MMHG | BODY MASS INDEX: 33.36 KG/M2 | HEART RATE: 74 BPM | SYSTOLIC BLOOD PRESSURE: 138 MMHG

## 2021-05-03 DIAGNOSIS — Z36.2 ENCOUNTER FOR OTHER ANTENATAL SCREENING FOLLOW-UP: Primary | ICD-10-CM

## 2021-05-03 DIAGNOSIS — Z3A.24 24 WEEKS GESTATION OF PREGNANCY: ICD-10-CM

## 2021-05-03 PROCEDURE — 99212 OFFICE O/P EST SF 10 MIN: CPT | Performed by: OBSTETRICS & GYNECOLOGY

## 2021-05-03 PROCEDURE — 3008F BODY MASS INDEX DOCD: CPT | Performed by: OBSTETRICS & GYNECOLOGY

## 2021-05-03 PROCEDURE — 1036F TOBACCO NON-USER: CPT | Performed by: OBSTETRICS & GYNECOLOGY

## 2021-05-03 PROCEDURE — 76816 OB US FOLLOW-UP PER FETUS: CPT | Performed by: OBSTETRICS & GYNECOLOGY

## 2021-05-03 NOTE — PROGRESS NOTES
The patient was seen today for an ultrasound  Please see ultrasound report (located under Ob Procedures) for additional details  Thank you very much for allowing us to participate in the care of this very nice patient  Should you have any questions, please do not hesitate to contact me  Brian Cooley MD [address] [facility]  Attending Physician, Joel

## 2021-05-03 NOTE — PATIENT INSTRUCTIONS
For a COVID-19 Vaccine in Pregnancy Decision Aid, go to https://foamcast [email or web address]/    The ABM (Academy of Breastfeeding Medicine) Statement on Considerations for COVID-19 Vaccination in Lactation is available at: TravelLesson tn  Finally, the CDC statement on Vaccination Consideration for People who are Pregnant or Breastfeeding is available at:  [place] [employer]      Here are the images (12/28/20) for the decision aid:

## 2021-05-10 ENCOUNTER — ROUTINE PRENATAL (OUTPATIENT)
Dept: OBGYN CLINIC | Facility: CLINIC | Age: 31
End: 2021-05-10

## 2021-05-10 VITALS
BODY MASS INDEX: 33.49 KG/M2 | HEIGHT: 65 IN | WEIGHT: 201 LBS | DIASTOLIC BLOOD PRESSURE: 72 MMHG | SYSTOLIC BLOOD PRESSURE: 110 MMHG

## 2021-05-10 DIAGNOSIS — Z36.89 ENCOUNTER FOR OTHER SPECIFIED ANTENATAL SCREENING: ICD-10-CM

## 2021-05-10 DIAGNOSIS — Z34.02 ENCOUNTER FOR SUPERVISION OF NORMAL FIRST PREGNANCY IN SECOND TRIMESTER: Primary | ICD-10-CM

## 2021-05-10 PROCEDURE — PNV: Performed by: NURSE PRACTITIONER

## 2021-05-10 PROCEDURE — 3008F BODY MASS INDEX DOCD: CPT | Performed by: OBSTETRICS & GYNECOLOGY

## 2021-05-10 NOTE — PROGRESS NOTES
Brock Briggs is doing well  Denies LOF/Bleeding/Cramping  +FM    Get 28 week labs in 3 weeks  RTO in 4 weeks   Fay was seen today for routine prenatal visit      Diagnoses and all orders for this visit:    Encounter for supervision of normal first pregnancy in second trimester    Encounter for other specified  screening  -     CBC and differential  -     Glucose, 1H PG  -     RPR

## 2021-06-04 LAB
BASOPHILS # BLD AUTO: 30 CELLS/UL (ref 0–200)
BASOPHILS NFR BLD AUTO: 0.3 %
EOSINOPHIL # BLD AUTO: 40 CELLS/UL (ref 15–500)
EOSINOPHIL NFR BLD AUTO: 0.4 %
ERYTHROCYTE [DISTWIDTH] IN BLOOD BY AUTOMATED COUNT: 12.1 % (ref 11–15)
GLUCOSE 1H P 50 G GLC PO SERPL-MCNC: 110 MG/DL
HCT VFR BLD AUTO: 34.7 % (ref 35–45)
HGB BLD-MCNC: 11.9 G/DL (ref 11.7–15.5)
LYMPHOCYTES # BLD AUTO: 1404 CELLS/UL (ref 850–3900)
LYMPHOCYTES NFR BLD AUTO: 13.9 %
MCH RBC QN AUTO: 32.2 PG (ref 27–33)
MCHC RBC AUTO-ENTMCNC: 34.3 G/DL (ref 32–36)
MCV RBC AUTO: 94 FL (ref 80–100)
MONOCYTES # BLD AUTO: 687 CELLS/UL (ref 200–950)
MONOCYTES NFR BLD AUTO: 6.8 %
NEUTROPHILS # BLD AUTO: 7939 CELLS/UL (ref 1500–7800)
NEUTROPHILS NFR BLD AUTO: 78.6 %
PLATELET # BLD AUTO: 282 THOUSAND/UL (ref 140–400)
PMV BLD REES-ECKER: 9.7 FL (ref 7.5–12.5)
RBC # BLD AUTO: 3.69 MILLION/UL (ref 3.8–5.1)
RPR SER QL: NORMAL
WBC # BLD AUTO: 10.1 THOUSAND/UL (ref 3.8–10.8)

## 2021-06-09 ENCOUNTER — ROUTINE PRENATAL (OUTPATIENT)
Dept: OBGYN CLINIC | Facility: CLINIC | Age: 31
End: 2021-06-09

## 2021-06-09 VITALS — WEIGHT: 202 LBS | SYSTOLIC BLOOD PRESSURE: 132 MMHG | BODY MASS INDEX: 33.61 KG/M2 | DIASTOLIC BLOOD PRESSURE: 76 MMHG

## 2021-06-09 DIAGNOSIS — Z34.83 PRENATAL CARE, SUBSEQUENT PREGNANCY, THIRD TRIMESTER: Primary | ICD-10-CM

## 2021-06-09 PROCEDURE — PNV: Performed by: OBSTETRICS & GYNECOLOGY

## 2021-06-09 NOTE — PROGRESS NOTES
Positive fetal movement, normal Glucola test, given fetal kick count sheet, to get Tdap vaccination on next visit  Recent ultrasound showed resolution of the cord plexus issue

## 2021-06-23 ENCOUNTER — ROUTINE PRENATAL (OUTPATIENT)
Dept: OBGYN CLINIC | Facility: CLINIC | Age: 31
End: 2021-06-23
Payer: COMMERCIAL

## 2021-06-23 VITALS — WEIGHT: 206.8 LBS | DIASTOLIC BLOOD PRESSURE: 64 MMHG | BODY MASS INDEX: 34.41 KG/M2 | SYSTOLIC BLOOD PRESSURE: 116 MMHG

## 2021-06-23 DIAGNOSIS — Z34.03 ENCOUNTER FOR SUPERVISION OF NORMAL FIRST PREGNANCY IN THIRD TRIMESTER: Primary | ICD-10-CM

## 2021-06-23 DIAGNOSIS — Z23 NEED FOR TDAP VACCINATION: ICD-10-CM

## 2021-06-23 PROCEDURE — PNV: Performed by: NURSE PRACTITIONER

## 2021-06-23 PROCEDURE — 90715 TDAP VACCINE 7 YRS/> IM: CPT | Performed by: NURSE PRACTITIONER

## 2021-06-23 PROCEDURE — 90471 IMMUNIZATION ADMIN: CPT | Performed by: NURSE PRACTITIONER

## 2021-06-23 RX ORDER — ASPIRIN 81 MG/1
81 TABLET ORAL DAILY
COMMUNITY
End: 2022-05-16 | Stop reason: ALTCHOICE

## 2021-06-23 NOTE — PROGRESS NOTES
King Bailey is doing well  Denies LOF/Bleeding/Cramping  +FM    Fetal growth scan scheduled for Monday  Continue fetal kick counts    RTO in 2 weeks  Corte Maderaaliyah Henderson was seen today for routine prenatal visit      Diagnoses and all orders for this visit:    Encounter for supervision of normal first pregnancy in third trimester    Need for Tdap vaccination  -     TDAP Vaccine greater than or equal to 8yo

## 2021-06-28 ENCOUNTER — ULTRASOUND (OUTPATIENT)
Dept: PERINATAL CARE | Facility: CLINIC | Age: 31
End: 2021-06-28
Payer: COMMERCIAL

## 2021-06-28 VITALS
DIASTOLIC BLOOD PRESSURE: 60 MMHG | HEIGHT: 65 IN | SYSTOLIC BLOOD PRESSURE: 118 MMHG | WEIGHT: 204.4 LBS | BODY MASS INDEX: 34.05 KG/M2 | HEART RATE: 80 BPM

## 2021-06-28 DIAGNOSIS — Z3A.32 32 WEEKS GESTATION OF PREGNANCY: Primary | ICD-10-CM

## 2021-06-28 DIAGNOSIS — Z36.89 ENCOUNTER FOR FETAL ANATOMIC SURVEY: ICD-10-CM

## 2021-06-28 DIAGNOSIS — Z36.4 ULTRASOUND FOR ANTENATAL SCREENING FOR FETAL GROWTH RESTRICTION: ICD-10-CM

## 2021-06-28 PROCEDURE — 99213 OFFICE O/P EST LOW 20 MIN: CPT | Performed by: OBSTETRICS & GYNECOLOGY

## 2021-06-28 PROCEDURE — 76816 OB US FOLLOW-UP PER FETUS: CPT | Performed by: OBSTETRICS & GYNECOLOGY

## 2021-06-28 PROCEDURE — 3008F BODY MASS INDEX DOCD: CPT | Performed by: OBSTETRICS & GYNECOLOGY

## 2021-06-28 NOTE — PATIENT INSTRUCTIONS
Kick Counts in Pregnancy   WHAT YOU NEED TO KNOW:   Kick counts measure how much your baby is moving in your womb  A kick from your baby can be felt as a twist, turn, swish, roll, or jab  It is common to feel your baby kicking at 26 to 28 weeks of pregnancy  You may feel your baby kick as early as 20 weeks of pregnancy  You may want to start counting at 28 weeks  DISCHARGE INSTRUCTIONS:   Contact your healthcare provider immediately if:   · You feel a change in the number of kicks or movements of your baby  · You feel fewer than 10 kicks within 2 hours  · You have questions or concerns about your baby's movements  Why measure kick counts:  Your baby's movement may provide information about your baby's health  He or she may move less, or not at all, if there are problems  Your baby may move less if he or she is not getting enough oxygen or nutrition from the placenta  Do not smoke while you are pregnant  Smoking decreases the amount of oxygen that gets to your baby  Talk to your healthcare provider if you need help to quit smoking  Tell your healthcare provider as soon as you feel a change in your baby's movements  When to measure kick counts:   · Measure kick counts at the same time every day  · Measure kick counts when your baby is awake and most active  Your baby may be most active in the evening  How to measure kick counts:  Check that your baby is awake before you measure kick counts  You can wake up your baby by lightly pushing on your belly, walking, or drinking something cold  Your healthcare provider may tell you different ways to measure kick counts  You may be told to do the following:  · Use a chart or clock to keep track of the time you start and finish counting  · Sit in a chair or lie on your left side  · Place your hands on the largest part of your belly  · Count until you reach 10 kicks  Write down how much time it takes to count 10 kicks       · It may take 30 minutes to 2 hours to count 10 kicks  It should not take more than 2 hours to count 10 kicks  Follow up with your healthcare provider as directed:  Write down your questions so you remember to ask them during your visits  © Copyright [address] Information is for End User's use only and may not be sold, redistributed or otherwise used for commercial purposes  All illustrations and images included in CareNotes® are the copyrighted property of A D A M , Inc  or [facility] Miguelito Collins   The above information is an  only  It is not intended as medical advice for individual conditions or treatments  Talk to your doctor, nurse or pharmacist before following any medical regimen to see if it is safe and effective for you

## 2021-06-28 NOTE — LETTER
June 28, 2021     Abraham Gallagher MD  [address]  [address] [employer]  [address] [place] [address]    Patient: Vera Quintana   YOB: 1990   Date of Visit: 6/28/2021       Dear Dr Marco Antonio Rivera: Thank you for referring Andi Pride to me for evaluation  Below are my notes for this consultation  If you have questions, please do not hesitate to call me  I look forward to following your patient along with you  Sincerely,        Doris High MD        CC: No Recipients  Doris High MD  6/27/2021  7:09 AM  Sign when Signing Visit  Please refer to the [facility] ultrasound report in Ob Procedures for additional information regarding today's visit 
June 28, 2021     Karla Narayan MD  [address]  [address] [employer]  [address] [place] [address]    Patient: Atiya Gaines   YOB: 1990   Date of Visit: 6/28/2021       Dear Dr Maurilio Gonzalez: Thank you for referring Mccrary Aydee to me for evaluation  Below are my notes for this consultation  If you have questions, please do not hesitate to call me  I look forward to following your patient along with you  Sincerely,        Chandni Sun MD        CC: No Recipients  Chandni Sun MD  6/27/2021  7:09 AM  Sign when Signing Visit  Please refer to the [facility] ultrasound report in Ob Procedures for additional information regarding today's visit 
Detail Level: Generalized

## 2021-07-07 ENCOUNTER — ROUTINE PRENATAL (OUTPATIENT)
Dept: OBGYN CLINIC | Facility: CLINIC | Age: 31
End: 2021-07-07

## 2021-07-07 VITALS — BODY MASS INDEX: 34.55 KG/M2 | WEIGHT: 207.6 LBS | SYSTOLIC BLOOD PRESSURE: 134 MMHG | DIASTOLIC BLOOD PRESSURE: 70 MMHG

## 2021-07-07 DIAGNOSIS — Z34.03 ENCOUNTER FOR SUPERVISION OF NORMAL FIRST PREGNANCY IN THIRD TRIMESTER: Primary | ICD-10-CM

## 2021-07-07 PROCEDURE — PNV: Performed by: OBSTETRICS & GYNECOLOGY

## 2021-07-07 NOTE — PROGRESS NOTES
Positive fetal movement, no complaints, she has received Tdap vaccination  Return to office in 2 weeks

## 2021-07-07 NOTE — PATIENT INSTRUCTIONS
No complaints or issues positive fetal movement on baby aspirin to stop at 36 weeks gestation return to office in 2 weeks

## 2021-07-23 ENCOUNTER — ROUTINE PRENATAL (OUTPATIENT)
Dept: OBGYN CLINIC | Facility: CLINIC | Age: 31
End: 2021-07-23

## 2021-07-23 VITALS — SYSTOLIC BLOOD PRESSURE: 100 MMHG | DIASTOLIC BLOOD PRESSURE: 60 MMHG | WEIGHT: 210.6 LBS | BODY MASS INDEX: 35.05 KG/M2

## 2021-07-23 DIAGNOSIS — Z34.83 PRENATAL CARE, SUBSEQUENT PREGNANCY, THIRD TRIMESTER: Primary | ICD-10-CM

## 2021-07-23 PROCEDURE — PNV: Performed by: OBSTETRICS & GYNECOLOGY

## 2021-07-23 NOTE — PATIENT INSTRUCTIONS
The patient will continue doing fetal kick counts look for signs of labor, she has stop taking baby aspirin at 36 weeks  Return my office in 1 week

## 2021-07-30 ENCOUNTER — ROUTINE PRENATAL (OUTPATIENT)
Dept: OBGYN CLINIC | Facility: CLINIC | Age: 31
End: 2021-07-30

## 2021-07-30 VITALS
HEIGHT: 65 IN | WEIGHT: 212 LBS | BODY MASS INDEX: 35.32 KG/M2 | SYSTOLIC BLOOD PRESSURE: 120 MMHG | DIASTOLIC BLOOD PRESSURE: 76 MMHG

## 2021-07-30 DIAGNOSIS — Z34.03 ENCOUNTER FOR SUPERVISION OF NORMAL FIRST PREGNANCY IN THIRD TRIMESTER: Primary | ICD-10-CM

## 2021-07-30 DIAGNOSIS — Z36.85 SCREENING, ANTENATAL, FOR STREPTOCOCCUS B: ICD-10-CM

## 2021-07-30 PROCEDURE — PNV: Performed by: NURSE PRACTITIONER

## 2021-07-30 PROCEDURE — 87150 DNA/RNA AMPLIFIED PROBE: CPT | Performed by: NURSE PRACTITIONER

## 2021-07-30 PROCEDURE — 3008F BODY MASS INDEX DOCD: CPT | Performed by: OBSTETRICS & GYNECOLOGY

## 2021-07-30 NOTE — PROGRESS NOTES
Doc Gaudencio is doing well  Denies LOF/Bleeding/Cramping  +FM    GBS culture obtained  Perineal massage handout given and reviewed with patient  Continue fetal kick counts      RTO in 1 week

## 2021-08-01 LAB — GP B STREP DNA SPEC QL NAA+PROBE: POSITIVE

## 2021-08-06 ENCOUNTER — ROUTINE PRENATAL (OUTPATIENT)
Dept: OBGYN CLINIC | Facility: CLINIC | Age: 31
End: 2021-08-06

## 2021-08-06 VITALS — WEIGHT: 212.6 LBS | DIASTOLIC BLOOD PRESSURE: 74 MMHG | SYSTOLIC BLOOD PRESSURE: 118 MMHG | BODY MASS INDEX: 35.38 KG/M2

## 2021-08-06 DIAGNOSIS — Z34.83 PRENATAL CARE, SUBSEQUENT PREGNANCY, THIRD TRIMESTER: Primary | ICD-10-CM

## 2021-08-06 PROCEDURE — PNV: Performed by: OBSTETRICS & GYNECOLOGY

## 2021-08-06 NOTE — PATIENT INSTRUCTIONS
The patient was given a sheet about getting ready for labor  She is doing para normal size  She knows she is GBS positive  Return to  my office in 1 week

## 2021-08-13 ENCOUNTER — ROUTINE PRENATAL (OUTPATIENT)
Dept: OBGYN CLINIC | Facility: CLINIC | Age: 31
End: 2021-08-13

## 2021-08-13 VITALS — WEIGHT: 210 LBS | BODY MASS INDEX: 34.95 KG/M2 | SYSTOLIC BLOOD PRESSURE: 120 MMHG | DIASTOLIC BLOOD PRESSURE: 68 MMHG

## 2021-08-13 DIAGNOSIS — Z34.83 PRENATAL CARE, SUBSEQUENT PREGNANCY, THIRD TRIMESTER: Primary | ICD-10-CM

## 2021-08-13 PROCEDURE — PNV: Performed by: OBSTETRICS & GYNECOLOGY

## 2021-08-13 NOTE — PROGRESS NOTES
Positive cervical change, positive fetal movement, looking for signs symptoms of labor, return to office in 1 week  She is GBS positive

## 2021-08-13 NOTE — PATIENT INSTRUCTIONS
Positive fetal movement cervical changes noted  To continue look for signs symptoms of labor  Return my office in 1 week

## 2021-08-15 ENCOUNTER — NURSE TRIAGE (OUTPATIENT)
Dept: OTHER | Facility: OTHER | Age: 31
End: 2021-08-15

## 2021-08-15 ENCOUNTER — HOSPITAL ENCOUNTER (OUTPATIENT)
Facility: HOSPITAL | Age: 31
Discharge: HOME | End: 2021-08-15
Attending: OBSTETRICS & GYNECOLOGY | Admitting: OBSTETRICS & GYNECOLOGY
Payer: COMMERCIAL

## 2021-08-15 VITALS
WEIGHT: 210 LBS | SYSTOLIC BLOOD PRESSURE: 122 MMHG | HEIGHT: 65 IN | HEART RATE: 86 BPM | DIASTOLIC BLOOD PRESSURE: 71 MMHG | TEMPERATURE: 99.1 F | OXYGEN SATURATION: 100 % | RESPIRATION RATE: 16 BRPM | BODY MASS INDEX: 34.99 KG/M2

## 2021-08-15 PROBLEM — Z3A.38 38 WEEKS GESTATION OF PREGNANCY: Status: ACTIVE | Noted: 2021-08-15

## 2021-08-15 LAB
ALBUMIN SERPL BCP-MCNC: 3 G/DL (ref 3.5–5)
ALP SERPL-CCNC: 126 U/L (ref 46–116)
ALT SERPL W P-5'-P-CCNC: 19 U/L (ref 12–78)
ANION GAP SERPL CALCULATED.3IONS-SCNC: 12 MMOL/L (ref 4–13)
AST SERPL W P-5'-P-CCNC: 17 U/L (ref 5–45)
BILIRUB SERPL-MCNC: 0.62 MG/DL (ref 0.2–1)
BUN SERPL-MCNC: 9 MG/DL (ref 5–25)
CALCIUM ALBUM COR SERPL-MCNC: 10.2 MG/DL (ref 8.3–10.1)
CALCIUM SERPL-MCNC: 9.4 MG/DL (ref 8.3–10.1)
CHLORIDE SERPL-SCNC: 104 MMOL/L (ref 100–108)
CO2 SERPL-SCNC: 22 MMOL/L (ref 21–32)
CREAT SERPL-MCNC: 0.65 MG/DL (ref 0.6–1.3)
CREAT UR-MCNC: 127 MG/DL
ERYTHROCYTE [DISTWIDTH] IN BLOOD BY AUTOMATED COUNT: 12.6 % (ref 11.6–15.1)
GFR SERPL CREATININE-BSD FRML MDRD: 120 ML/MIN/1.73SQ M
GLUCOSE SERPL-MCNC: 97 MG/DL (ref 65–140)
HCT VFR BLD AUTO: 38.2 % (ref 34.8–46.1)
HGB BLD-MCNC: 13 G/DL (ref 11.5–15.4)
MCH RBC QN AUTO: 30.6 PG (ref 26.8–34.3)
MCHC RBC AUTO-ENTMCNC: 34 G/DL (ref 31.4–37.4)
MCV RBC AUTO: 90 FL (ref 82–98)
PLATELET # BLD AUTO: 276 THOUSANDS/UL (ref 149–390)
PMV BLD AUTO: 9.8 FL (ref 8.9–12.7)
POTASSIUM SERPL-SCNC: 3.8 MMOL/L (ref 3.5–5.3)
PROT SERPL-MCNC: 7.4 G/DL (ref 6.4–8.2)
PROT UR-MCNC: 21 MG/DL
PROT/CREAT UR: 0.17 MG/G{CREAT} (ref 0–0.1)
RBC # BLD AUTO: 4.25 MILLION/UL (ref 3.81–5.12)
SODIUM SERPL-SCNC: 138 MMOL/L (ref 136–145)
WBC # BLD AUTO: 12.18 THOUSAND/UL (ref 4.31–10.16)

## 2021-08-15 PROCEDURE — NC001 PR NO CHARGE: Performed by: OBSTETRICS & GYNECOLOGY

## 2021-08-15 PROCEDURE — 80053 COMPREHEN METABOLIC PANEL: CPT | Performed by: OBSTETRICS & GYNECOLOGY

## 2021-08-15 PROCEDURE — 82570 ASSAY OF URINE CREATININE: CPT | Performed by: OBSTETRICS & GYNECOLOGY

## 2021-08-15 PROCEDURE — 85027 COMPLETE CBC AUTOMATED: CPT | Performed by: OBSTETRICS & GYNECOLOGY

## 2021-08-15 PROCEDURE — 99215 OFFICE O/P EST HI 40 MIN: CPT

## 2021-08-15 PROCEDURE — 84156 ASSAY OF PROTEIN URINE: CPT | Performed by: OBSTETRICS & GYNECOLOGY

## 2021-08-15 NOTE — TELEPHONE ENCOUNTER
TC to on-call, "Lucas Chavarria RN/ Pt: Ovidio Colon [phone] Patient is 38,6 juliana 4-6 minutes apart  Sending to LD "    Reason for Disposition   [1] First baby (primipara) AND [2] contractions < 6 minutes apart  AND [3] present 2 hours    Answer Assessment - Initial Assessment Questions  1  ONSET: "When did the symptoms begin?"         Midnight   2  CONTRACTIONS: "Describe the contractions that you are having " (e g , duration, frequency, regularity, severity)      4-6 minutes, 40-60 seconds longs  3  ROSAURA: "What date are you expecting to deliver?"      8/23  4  PARITY: "Have you had a baby before?" If Yes, ask: "How long did the labor last?"      Denies  5  FETAL MOVEMENT: "Has the baby's movement decreased or changed significantly from normal?"      Denies, Good  6  OTHER SYMPTOMS: "Do you have any other symptoms?" (e g , leaking fluid from vagina, vaginal bleeding, fever, hand/facial swelling)      Bloody show      Protocols used: PREGNANCY - LABOR-ADULT-

## 2021-08-15 NOTE — PROGRESS NOTES
Triage Note - OB  Alroy Levels 36 y o  female MRN: [id number]  Unit/Bed#: LD TRIAGE 4-01 Encounter: [id number]    Chief Complaint: contractions    SUBJECTIVE    HPI: 36 y o  Nate Do at 38w6d with c/o contractions occurring every 4-6 minutes for the last 2 hours  She denies any vaginal bleeding, loss of fluid and reports good fetal movement  OBJECTIVE  Estimated Date of Delivery: 8/23/21    Vitals: VSS  Vitals:    08/15/21 0836   BP: 121/78   Pulse: 78   Resp:    Temp:    SpO2:      Body mass index is 34 95 kg/m²  FHR: 120s, reactive  [place]: q3-6m    Physical Exam  Constitutional:       Appearance: Normal appearance  HENT:      Head: Normocephalic and atraumatic  Eyes:      Extraocular Movements: Extraocular movements intact  Pulmonary:      Effort: Pulmonary effort is normal    Abdominal:      Comments: Gravid   Musculoskeletal:         General: Normal range of motion  Skin:     General: Skin is warm  Neurological:      Mental Status: She is alert and oriented to person, place, and time     Psychiatric:         Mood and Affect: Mood normal          Behavior: Behavior normal           Labs:   Admission on 08/15/2021   Component Date Value    WBC 08/15/2021 12 18*    RBC 08/15/2021 4 25     Hemoglobin 08/15/2021 13 0     Hematocrit 08/15/2021 38 2     MCV 08/15/2021 90     MCH 08/15/2021 30 6     MCHC 08/15/2021 34 0     RDW 08/15/2021 12 6     Platelets 45/19/6528 276     MPV 08/15/2021 9 8     Sodium 08/15/2021 138     Potassium 08/15/2021 3 8     Chloride 08/15/2021 104     CO2 08/15/2021 22     ANION GAP 08/15/2021 12     BUN 08/15/2021 9     Creatinine 08/15/2021 0 65     Glucose 08/15/2021 97     Calcium 08/15/2021 9 4     Corrected Calcium 08/15/2021 10 2*    AST 08/15/2021 17     ALT 08/15/2021 19     Alkaline Phosphatase 08/15/2021 126*    Total Protein 08/15/2021 7 4     Albumin 08/15/2021 3 0*    Total Bilirubin 08/15/2021 0 62     eGFR 08/15/2021 120  Creatinine, Ur 08/15/2021 127 0     Protein Urine Random 08/15/2021 21     Prot/Creat Ratio, Ur 08/15/2021 0 17*           A/P  36 y o  female  at 38w7d with c/o contractions, ruled out for labor  R/o labor   - SVE 3/60/-2, 2h apart   - NST reactive     Single elevated BP   - PreE labs all wnl     Discussed with Dr Keyana Bernal: d/c home with labor precautions  Discharge instructions given to patient and labor precautions reviewed      Dev Sales MD  OB-GYN, PGY-2  8/15/2021 9:42 AM

## 2021-08-15 NOTE — DISCHARGE INSTRUCTIONS
Pregnancy at 44 to 40 Weeks   AMBULATORY CARE:   What changes are happening to your body: You are now getting close to your due date  Your due date is just an estimate of when your baby will be born  Your baby may be born before or after your due date  Your breathing may be easier if your baby has moved down into a head-down position  You may need to urinate more often because the baby may be pressing on your bladder  You may also feel more discomfort and tire easily  You may also be having trouble sleeping  Seek care immediately if:   · You develop a severe headache that does not go away  · You have new or increased vision changes, such as blurred or spotted vision  · You have new or increased swelling in your face or hands  · You have vaginal spotting or bleeding  · Your water broke or you feel warm water gushing or trickling from your vagina  Contact your healthcare provider if:   · You have more than 5 contractions in 1 hour  · You notice any changes in your baby's movements  · You have abdominal cramps, pressure, or tightening  · You have a change in vaginal discharge  · You have chills or a fever  · You have vaginal itching, burning, or pain  · You have yellow, green, white, or foul-smelling vaginal discharge  · You have pain or burning when you urinate, less urine than usual, or pink or bloody urine  · You have questions or concerns about your condition or care  How to care for yourself at this stage of your pregnancy:   · Eat a variety of healthy foods  Healthy foods include fruits, vegetables, whole-grain breads, low-fat dairy foods, beans, lean meats, and fish  Drink liquids as directed  Ask how much liquid to drink each day and which liquids are best for you  Limit caffeine to less than 200 milligrams each day  Limit your intake of fish to 2 servings each week  Choose fish low in mercury such as canned light tuna, shrimp, salmon, cod, or tilapia   Do not  eat fish high in mercury such as swordfish, tilefish, bernard mackerel, and shark  · Take prenatal vitamins as directed  Your need for certain vitamins and minerals, such as folic acid, increases during pregnancy  Prenatal vitamins provide some of the extra vitamins and minerals you need  Prenatal vitamins may also help to decrease the risk of certain birth defects  · Rest as needed  Put your feet up if you have swelling in your ankles and feet  · Do not smoke  Smoking increases your risk of a miscarriage and other health problems during your pregnancy  Smoking can cause your baby to be born early or weigh less at birth  Ask your healthcare provider for information if you need help quitting  · Do not drink alcohol  Alcohol passes from your body to your baby through the placenta  It can affect your baby's brain development and cause fetal alcohol syndrome (FAS)  FAS is a group of conditions that causes mental, behavior, and growth problems  · Talk to your healthcare provider before you take any medicines  Many medicines may harm your baby if you take them when you are pregnant  Do not take any medicines, vitamins, herbs, or supplements without first talking to your healthcare provider  Never use illegal or street drugs (such as marijuana or cocaine) while you are pregnant  · Talk to your healthcare provider before you travel  You may not be able to travel in an airplane after 36 weeks  He may also recommend that you avoid long road trips  Safety tips during pregnancy:   · Avoid hot tubs and saunas  Do not use a hot tub or sauna while you are pregnant, especially during your first trimester  Hot tubs and saunas may raise your baby's temperature and increase the risk of birth defects  · Avoid toxoplasmosis  This is an infection caused by eating raw meat or being around infected cat feces  It can cause birth defects, miscarriages, and other problems   Wash your hands after you touch raw meat  Make sure any meat is well-cooked before you eat it  Avoid raw eggs and unpasteurized milk  Use gloves or ask someone else to clean your cat's litter box while you are pregnant  · Ask your healthcare provider about travel  The most comfortable time to travel is during the second trimester  Ask your healthcare provider if you can travel after 36 weeks  You may not be able to travel in an airplane after 36 weeks  He may also recommend that you avoid long road trips  Changes that are happening with your baby: Your baby is ready to be born  At birth, your baby may weigh about 6 to 9 pounds and be about 19 to 21 inches long  Your baby may be in a head-down position  Your baby will also rest lower in your abdomen  What you need to know about prenatal care: Your healthcare provider will check your blood pressure and weight  You may also need the following:  · A urine test  may also be done to check for sugar and protein  These can be signs of gestational diabetes or infection  Protein in your urine may also be a sign of preeclampsia  Preeclampsia is a condition that can develop during week 20 or later of your pregnancy  It causes high blood pressure, and it can cause problems with your kidneys and other organs  · Your baby's heart rate  will be checked  © Copyright [employer] 2021 Information is for End User's use only and may not be sold, redistributed or otherwise used for commercial purposes  All illustrations and images included in CareNotes® are the copyrighted property of [employer] M , Inc  or Teetee Collins   The above information is an  only  It is not intended as medical advice for individual conditions or treatments  Talk to your doctor, nurse or pharmacist before following any medical regimen to see if it is safe and effective for you

## 2021-08-16 ENCOUNTER — HOSPITAL ENCOUNTER (INPATIENT)
Facility: HOSPITAL | Age: 31
LOS: 2 days | Discharge: HOME | End: 2021-08-18
Attending: OBSTETRICS & GYNECOLOGY | Admitting: OBSTETRICS & GYNECOLOGY
Payer: COMMERCIAL

## 2021-08-16 ENCOUNTER — ANESTHESIA (INPATIENT)
Dept: ANESTHESIOLOGY | Facility: HOSPITAL | Age: 31
End: 2021-08-16
Payer: COMMERCIAL

## 2021-08-16 ENCOUNTER — TELEPHONE (OUTPATIENT)
Dept: OBGYN CLINIC | Facility: CLINIC | Age: 31
End: 2021-08-16

## 2021-08-16 ENCOUNTER — ANESTHESIA EVENT (INPATIENT)
Dept: ANESTHESIOLOGY | Facility: HOSPITAL | Age: 31
End: 2021-08-16
Payer: COMMERCIAL

## 2021-08-16 DIAGNOSIS — Z3A.39 39 WEEKS GESTATION OF PREGNANCY: ICD-10-CM

## 2021-08-16 LAB
ABO GROUP BLD: NORMAL
BLD GP AB SCN SERPL QL: NEGATIVE
ERYTHROCYTE [DISTWIDTH] IN BLOOD BY AUTOMATED COUNT: 12.7 % (ref 11.6–15.1)
HCT VFR BLD AUTO: 39.4 % (ref 34.8–46.1)
HGB BLD-MCNC: 12.8 G/DL (ref 11.5–15.4)
HOLD SPECIMEN: NORMAL
MCH RBC QN AUTO: 30 PG (ref 26.8–34.3)
MCHC RBC AUTO-ENTMCNC: 32.5 G/DL (ref 31.4–37.4)
MCV RBC AUTO: 92 FL (ref 82–98)
PLATELET # BLD AUTO: 281 THOUSANDS/UL (ref 149–390)
PMV BLD AUTO: 9.5 FL (ref 8.9–12.7)
RBC # BLD AUTO: 4.27 MILLION/UL (ref 3.81–5.12)
RH BLD: POSITIVE
SPECIMEN EXPIRATION DATE: NORMAL
WBC # BLD AUTO: 13.32 THOUSAND/UL (ref 4.31–10.16)

## 2021-08-16 PROCEDURE — 86900 BLOOD TYPING SEROLOGIC ABO: CPT

## 2021-08-16 PROCEDURE — 86592 SYPHILIS TEST NON-TREP QUAL: CPT

## 2021-08-16 PROCEDURE — 85027 COMPLETE CBC AUTOMATED: CPT

## 2021-08-16 PROCEDURE — 86850 RBC ANTIBODY SCREEN: CPT

## 2021-08-16 PROCEDURE — NC001 PR NO CHARGE: Performed by: OBSTETRICS & GYNECOLOGY

## 2021-08-16 PROCEDURE — 99214 OFFICE O/P EST MOD 30 MIN: CPT

## 2021-08-16 PROCEDURE — 86901 BLOOD TYPING SEROLOGIC RH(D): CPT

## 2021-08-16 PROCEDURE — 4A1HXCZ MONITORING OF PRODUCTS OF CONCEPTION, CARDIAC RATE, EXTERNAL APPROACH: ICD-10-PCS | Performed by: OBSTETRICS & GYNECOLOGY

## 2021-08-16 PROCEDURE — 3E033VJ INTRODUCTION OF OTHER HORMONE INTO PERIPHERAL VEIN, PERCUTANEOUS APPROACH: ICD-10-PCS | Performed by: OBSTETRICS & GYNECOLOGY

## 2021-08-16 RX ORDER — ONDANSETRON 2 MG/ML
4 INJECTION INTRAMUSCULAR; INTRAVENOUS EVERY 6 HOURS PRN
Status: DISCONTINUED | OUTPATIENT
Start: 2021-08-16 | End: 2021-08-17

## 2021-08-16 RX ORDER — SODIUM CHLORIDE, SODIUM LACTATE, POTASSIUM CHLORIDE, CALCIUM CHLORIDE 600; 310; 30; 20 MG/100ML; MG/100ML; MG/100ML; MG/100ML
125 INJECTION, SOLUTION INTRAVENOUS CONTINUOUS
Status: DISCONTINUED | OUTPATIENT
Start: 2021-08-16 | End: 2021-08-17

## 2021-08-16 RX ORDER — LIDOCAINE HYDROCHLORIDE AND EPINEPHRINE 15; 5 MG/ML; UG/ML
INJECTION, SOLUTION EPIDURAL
Status: COMPLETED | OUTPATIENT
Start: 2021-08-16 | End: 2021-08-16

## 2021-08-16 RX ORDER — OXYTOCIN/RINGER'S LACTATE 30/500 ML
1-30 PLASTIC BAG, INJECTION (ML) INTRAVENOUS
Status: DISCONTINUED | OUTPATIENT
Start: 2021-08-16 | End: 2021-08-17

## 2021-08-16 RX ADMIN — SODIUM CHLORIDE, SODIUM LACTATE, POTASSIUM CHLORIDE, AND CALCIUM CHLORIDE 125 ML/HR: .6; .31; .03; .02 INJECTION, SOLUTION INTRAVENOUS at 19:09

## 2021-08-16 RX ADMIN — Medication 2 MILLI-UNITS/MIN: at 19:57

## 2021-08-16 RX ADMIN — SODIUM CHLORIDE, SODIUM LACTATE, POTASSIUM CHLORIDE, AND CALCIUM CHLORIDE 125 ML/HR: .6; .31; .03; .02 INJECTION, SOLUTION INTRAVENOUS at 22:43

## 2021-08-16 RX ADMIN — SODIUM CHLORIDE 5 MILLION UNITS: 0.9 INJECTION, SOLUTION INTRAVENOUS at 19:09

## 2021-08-16 RX ADMIN — ROPIVACAINE HYDROCHLORIDE: 2 INJECTION, SOLUTION EPIDURAL; INFILTRATION at 22:50

## 2021-08-16 RX ADMIN — SODIUM CHLORIDE 2.5 MILLION UNITS: 9 INJECTION, SOLUTION INTRAVENOUS at 23:03

## 2021-08-16 RX ADMIN — LIDOCAINE HYDROCHLORIDE AND EPINEPHRINE 3 ML: 15; 5 INJECTION, SOLUTION EPIDURAL at 22:39

## 2021-08-16 NOTE — H&P
Adrian [age] y o  female MRN: [id number]  Unit/Bed#: LD TRIAGE  Encounter: [id number]      Assessment: 36 y o  Jade Brew at 39w0d admitted for elective induction of labor  SVE: 3 /-2  FHT: baseline 120, moderate variability   Clinical EFW: 20%  ; Vertex confirmed by bedside ultrasound   GBS status: positive   Postpartum contraception plan: Micronor       Plan:   · Admit  · CBC, RPR, Type & Screen  · Analgesia at maternal request  · Will plan to begin induction with pitocin followed by AROM   · Antibiotics: PCN for GBS ppx     Dr Madalyn Cruz aware        SUBJECTIVE:    Chief Complaint: "I am here for my induction of labor"     HPI: Jaden Adair is a 36 y o  Jade Mustafaw with an ROSAURA of 2021, by Ultrasound at 39w0d who is being admitted for elective induction of labor  She complains of uterine contractions, occurring irregularly, has no LOF, and reports no VB  She states she has felt good FM  Taylor Berger Pregnancy complications: none    Patient Active Problem List   Diagnosis    Encounter for well adult exam without abnormal findings    BMI 30 0-30 9,adult    Seasonal allergies    39 weeks gestation of pregnancy       Baby complications/comments: none    Review of Systems   Constitutional: Negative  HENT: Negative  Eyes: Negative  Respiratory: Negative  Cardiovascular: Negative  Gastrointestinal: Negative  Endocrine: Negative  Genitourinary: Negative  Musculoskeletal: Negative  Neurological: Negative  Psychiatric/Behavioral: Negative          OB History    Para Term  AB Living   1 0 0 0 0 0   SAB TAB Ectopic Multiple Live Births   0 0 0 0 0      # Outcome Date GA Lbr Aidan/2nd Weight Sex Delivery Anes PTL Lv   1 Current                Past Medical History:   Diagnosis Date    Acne     Allergic     seasonal       Past Surgical History:   Procedure Laterality Date    WISDOM TOOTH EXTRACTION      WISDOM TOOTH EXTRACTION         Social History     Tobacco Use    Smoking status: Never Smoker    Smokeless tobacco: Never Used   Substance Use Topics    Alcohol use: Yes     Alcohol/week: 2 0 standard drinks     Types: 1 Glasses of wine, 1 Cans of beer per week     Comment: social - none since preg       No Known Allergies    Medications Prior to Admission   Medication    Prenatal MV-Min-Fe Fum-FA-DHA (PRENATAL+DHA PO)    aspirin (ECOTRIN LOW STRENGTH) 81 mg EC tablet    aspirin (ECOTRIN LOW STRENGTH) 81 mg EC tablet    fexofenadine (ALLEGRA) 180 MG tablet    Multiple Vitamin (MULTIVITAMIN) tablet    Probiotic Product (PROBIOTIC DAILY PO)           OBJECTIVE:  Vitals:  Temp:  [98 7 °F (37 1 °C)] 98 7 °F (37 1 °C)  HR:  [85] 85  Resp:  [18] 18  BP: (131)/(79) 131/79  There is no height or weight on file to calculate BMI  Physical Exam:  Physical Exam  Constitutional:       Appearance: Normal appearance  Cardiovascular:      Rate and Rhythm: Normal rate and regular rhythm  Heart sounds: Normal heart sounds  Pulmonary:      Effort: Pulmonary effort is normal       Breath sounds: Normal breath sounds  Abdominal:      Comments: gravid   Neurological:      General: No focal deficit present  Mental Status: She is alert  Skin:     General: Skin is warm and dry     Psychiatric:         Mood and Affect: Mood normal           SVE:   3 5/60/-2    FHT:  Baseline Rate: 120 bpm  Variability: Moderate 6-25 bpm  Accelerations: 15 x 15 or greater  Decelerations: None  FHR Category: Category I    TOCO:   Contraction Frequency (minutes): 2-5  Contraction Duration (seconds): 50-70  Contraction Quality: Mild    Lab Results   Component Value Date    WBC 12 18 (H) 08/15/2021    HGB 13 0 08/15/2021    HCT 38 2 08/15/2021     08/15/2021     Lab Results   Component Value Date    K 3 8 08/15/2021     08/15/2021    CO2 22 08/15/2021    BUN 9 08/15/2021    CREATININE 0 65 08/15/2021    AST 17 08/15/2021    ALT 19 08/15/2021       Prenatal Labs:      Blood type: A+  Antibody: negative  Group B strep: positive  HIV: negative  Hepatitis B: negative  RPR: non-reactive  Rubella: Immune  Varicella: Not immune  1 hour Glucose: 77  Platelets: 661  Hgb: 12 6    >2 Midnights  INPATIENT       Jeremy Allen MD  OB/GYN PGY-1  8/16/2021  6:57 PM

## 2021-08-16 NOTE — TELEPHONE ENCOUNTER
OB - 39 wk - seen in L&D 8/15/2021 for contractions - monitored - 3 cm, unchanged after 2 hrs  Having bouts of regular contractions for approx 45 min (3-4 times) q 4-5 min lasting 45-60 sec, stronger today, no SROM, continues with bloody show since 8/13/2021, (+) FM  To L&D to re-evaluate/JSW  Pt informed  Charge nurse Cyndie Goldberg) notified

## 2021-08-16 NOTE — PROGRESS NOTES
L&D Triage Note - OB/GYN  Jaden Adair 36 y o  female MRN: [id number]  Unit/Bed#: LD TRIAGE  Encounter: [id number]      ASSESSMENT:    Jaden Adair is a 36 y o   at 39w0d presenting with varying contractions between every 4-6 minutes to every few hours  +bloody show, +contractions, no LOF, LFM, ROS otherwise negative  Checked yest at 3cm  PLAN:    1) R/o labor  - Cervical exam unchanged from yesterday, 3/60/-2  - NST reactive, accelerations present, no decelerations, baseline 125 bpm  - Contractions inconsistent in frequency  - Discussed with Dr Madalyn Cruz, plan for 2h recheck  - If unchanged, consider scheduling for IOL  - Defer discharge to night team    2) Consider discharge from [facility] triage with term labor precautions    - Reviewed rupture of membranes, false vs true labor, decreased fetal movement, and vaginal bleeding   - Pt to call provider with any concerns and follow up at her next scheduled prenatal appointment 21 with Dr Madalyn Cruz   - Continue routine prenatal care   - Case discussed with Dr Mariana Rios:    Jdaen Adair 36 y o  Jade Brew at 39w0d with an Estimated Date of Delivery: 21   Patient presented in triage yesterday for r/o labor with SVE 3/60/-2 and contractions q4-6m  Presents for r/o labor today  Patient reports contractions q6m apart with one episode of <5m apart earlier this afternoon but inconsistent in frequency  Her current obstetrical history is significant for GBS+ at 36w4d    Contractions: varying from 4-6min to hourly    Leakage of fluid: none  Vaginal Bleeding: bloody show  Fetal movement: present    OBJECTIVE:    Vitals:    21 1610   BP: 131/79   Pulse: 85   Resp: 18   Temp: 98 7 °F (37 1 °C)       ROS:  Constitutional: Negative  Respiratory: Negative  Cardiovascular: Negative    Gastrointestinal: Negative    General Physical Exam:  General: in no apparent distress  Cardiovascular: Cor RRR  Lungs: non-labored breathing, CTAB  Abdomen: gravid, abdomen is soft without significant tenderness, masses, organomegaly or guarding  Lower extremeties: nontender    Cervical Exam  SVE: 3 / 60% / -2    Fetal monitoring:  FHT:  125 bpm/ Moderate 6 - 25 bpm / 15 x 15 accelerations present, no decelerations  [place]: contractions inconsistent, q4-6m      Radha Penn MD  OBGYN PGY-1  8/16/2021 4:49 PM

## 2021-08-17 LAB
BASE EXCESS BLDCOA CALC-SCNC: -4.5 MMOL/L (ref 3–11)
BASE EXCESS BLDCOV CALC-SCNC: -1.1 MMOL/L (ref 1–9)
HCO3 BLDCOA-SCNC: 21.5 MMOL/L (ref 17.3–27.3)
HCO3 BLDCOV-SCNC: 22.2 MMOL/L (ref 12.2–28.6)
O2 CT VFR BLDCOA CALC: 4.8 ML/DL
OXYHGB MFR BLDCOA: 25.1 %
OXYHGB MFR BLDCOV: 55.3 %
PCO2 BLDCOA: 42.9 MM[HG] (ref 30–60)
PCO2 BLDCOV: 32.9 MM HG (ref 27–43)
PH BLDCOA: 7.32 [PH] (ref 7.23–7.43)
PH BLDCOV: 7.45 [PH] (ref 7.19–7.49)
PO2 BLDCOA: 14.7 MM HG (ref 5–25)
PO2 BLDCOV: 21.7 MM HG (ref 15–45)
RPR SER QL: NORMAL
SAO2 % BLDCOV: 10.8 ML/DL

## 2021-08-17 PROCEDURE — 10907ZC DRAINAGE OF AMNIOTIC FLUID, THERAPEUTIC FROM PRODUCTS OF CONCEPTION, VIA NATURAL OR ARTIFICIAL OPENING: ICD-10-PCS | Performed by: OBSTETRICS & GYNECOLOGY

## 2021-08-17 PROCEDURE — 10H07YZ INSERTION OF OTHER DEVICE INTO PRODUCTS OF CONCEPTION, VIA NATURAL OR ARTIFICIAL OPENING: ICD-10-PCS | Performed by: OBSTETRICS & GYNECOLOGY

## 2021-08-17 PROCEDURE — 59400 OBSTETRICAL CARE: CPT | Performed by: OBSTETRICS & GYNECOLOGY

## 2021-08-17 PROCEDURE — 82805 BLOOD GASES W/O2 SATURATION: CPT | Performed by: OBSTETRICS & GYNECOLOGY

## 2021-08-17 PROCEDURE — 0KQM0ZZ REPAIR PERINEUM MUSCLE, OPEN APPROACH: ICD-10-PCS | Performed by: OBSTETRICS & GYNECOLOGY

## 2021-08-17 PROCEDURE — 99024 POSTOP FOLLOW-UP VISIT: CPT | Performed by: OBSTETRICS & GYNECOLOGY

## 2021-08-17 RX ORDER — CALCIUM CARBONATE 200(500)MG
1000 TABLET,CHEWABLE ORAL DAILY PRN
Status: DISCONTINUED | OUTPATIENT
Start: 2021-08-17 | End: 2021-08-18 | Stop reason: HOSPADM

## 2021-08-17 RX ORDER — OXYTOCIN/RINGER'S LACTATE 30/500 ML
250 PLASTIC BAG, INJECTION (ML) INTRAVENOUS
Status: ACTIVE | OUTPATIENT
Start: 2021-08-17 | End: 2021-08-17

## 2021-08-17 RX ORDER — DIPHENHYDRAMINE HCL 25 MG
25 TABLET ORAL EVERY 6 HOURS PRN
Status: DISCONTINUED | OUTPATIENT
Start: 2021-08-17 | End: 2021-08-18 | Stop reason: HOSPADM

## 2021-08-17 RX ORDER — DIAPER,BRIEF,INFANT-TODD,DISP
1 EACH MISCELLANEOUS 4 TIMES DAILY PRN
Status: DISCONTINUED | OUTPATIENT
Start: 2021-08-17 | End: 2021-08-18 | Stop reason: HOSPADM

## 2021-08-17 RX ORDER — SIMETHICONE 80 MG
80 TABLET,CHEWABLE ORAL 4 TIMES DAILY PRN
Status: DISCONTINUED | OUTPATIENT
Start: 2021-08-17 | End: 2021-08-18 | Stop reason: HOSPADM

## 2021-08-17 RX ORDER — CALCIUM CARBONATE 200(500)MG
500 TABLET,CHEWABLE ORAL DAILY PRN
Status: DISCONTINUED | OUTPATIENT
Start: 2021-08-17 | End: 2021-08-17

## 2021-08-17 RX ORDER — ACETAMINOPHEN 325 MG/1
650 TABLET ORAL EVERY 4 HOURS PRN
Status: DISCONTINUED | OUTPATIENT
Start: 2021-08-17 | End: 2021-08-18 | Stop reason: HOSPADM

## 2021-08-17 RX ORDER — DOCUSATE SODIUM 100 MG/1
100 CAPSULE, LIQUID FILLED ORAL 2 TIMES DAILY
Status: DISCONTINUED | OUTPATIENT
Start: 2021-08-17 | End: 2021-08-18 | Stop reason: HOSPADM

## 2021-08-17 RX ORDER — IBUPROFEN 600 MG/1
600 TABLET ORAL EVERY 6 HOURS PRN
Status: DISCONTINUED | OUTPATIENT
Start: 2021-08-17 | End: 2021-08-18 | Stop reason: HOSPADM

## 2021-08-17 RX ORDER — ONDANSETRON 2 MG/ML
4 INJECTION INTRAMUSCULAR; INTRAVENOUS EVERY 8 HOURS PRN
Status: DISCONTINUED | OUTPATIENT
Start: 2021-08-17 | End: 2021-08-18 | Stop reason: HOSPADM

## 2021-08-17 RX ADMIN — ACETAMINOPHEN 650 MG: 325 TABLET, FILM COATED ORAL at 23:18

## 2021-08-17 RX ADMIN — CALCIUM CARBONATE (ANTACID) CHEW TAB 500 MG 500 MG: 500 CHEW TAB at 00:21

## 2021-08-17 RX ADMIN — IBUPROFEN 600 MG: 600 TABLET ORAL at 16:32

## 2021-08-17 RX ADMIN — SODIUM CHLORIDE 2.5 MILLION UNITS: 9 INJECTION, SOLUTION INTRAVENOUS at 03:09

## 2021-08-17 RX ADMIN — DOCUSATE SODIUM 100 MG: 100 CAPSULE ORAL at 09:16

## 2021-08-17 RX ADMIN — HYDROCORTISONE 1 APPLICATION: 1 CREAM TOPICAL at 09:16

## 2021-08-17 RX ADMIN — IBUPROFEN 600 MG: 600 TABLET ORAL at 20:41

## 2021-08-17 RX ADMIN — ROPIVACAINE HYDROCHLORIDE: 2 INJECTION, SOLUTION EPIDURAL; INFILTRATION at 03:37

## 2021-08-17 RX ADMIN — SODIUM CHLORIDE 2.5 MILLION UNITS: 9 INJECTION, SOLUTION INTRAVENOUS at 07:08

## 2021-08-17 RX ADMIN — DOCUSATE SODIUM 100 MG: 100 CAPSULE ORAL at 16:32

## 2021-08-17 RX ADMIN — Medication 250 MILLI-UNITS/MIN: at 07:24

## 2021-08-17 RX ADMIN — WITCH HAZEL 1 PAD: 500 SOLUTION RECTAL; TOPICAL at 09:16

## 2021-08-17 RX ADMIN — Medication 1 APPLICATION: at 09:16

## 2021-08-17 RX ADMIN — IBUPROFEN 600 MG: 600 TABLET ORAL at 10:21

## 2021-08-17 RX ADMIN — SODIUM CHLORIDE, SODIUM LACTATE, POTASSIUM CHLORIDE, AND CALCIUM CHLORIDE 125 ML/HR: .6; .31; .03; .02 INJECTION, SOLUTION INTRAVENOUS at 06:13

## 2021-08-17 NOTE — ANESTHESIA PROCEDURE NOTES
Epidural Block    Patient location during procedure: OB  Start time: 8/16/2021 10:33 PM  Reason for block: procedure for pain and at surgeon's request  Staffing  Performed: anesthesiologist   Anesthesiologist: Zuleika Scott MD  Preanesthetic Checklist  Completed: patient identified, IV checked, site marked, risks and benefits discussed, surgical consent, monitors and equipment checked, pre-op evaluation and timeout performed  Epidural  Patient position: sitting  Prep: ChloraPrep  Patient monitoring: cardiac monitor and frequent blood pressure checks  Approach: midline  Location: lumbar  Injection technique: ARIS saline  Needle  Needle type: Tuohy   Needle gauge: 18 G  Catheter type: side hole  Catheter size: 20 G  Catheter at skin depth: 12 cm  Test dose: negativelidocaine 1 5% with epinephrine 1:200,000 test dose, 3 mL  Assessment  Number of attempts: 1negative aspiration for CSF, negative aspiration for heme and no paresthesia on injection  patient tolerated the procedure well with no immediate complications  Additional Notes  One attempt by LIS Taylor, easy placement

## 2021-08-17 NOTE — PLAN OF CARE
Problem: PAIN - ADULT  Goal: Verbalizes/displays adequate comfort level or baseline comfort level  Description: Interventions:  - Encourage patient to monitor pain and request assistance  - Assess pain using appropriate pain scale  - Administer analgesics based on type and severity of pain and evaluate response  - Implement non-pharmacological measures as appropriate and evaluate response  - Consider cultural and social influences on pain and pain management  - Notify physician/advanced practitioner if interventions unsuccessful or patient reports new pain  Outcome: Progressing     Problem: INFECTION - ADULT  Goal: Absence or prevention of progression during hospitalization  Description: INTERVENTIONS:  - Assess and monitor for signs and symptoms of infection  - Monitor lab/diagnostic results  - Monitor all insertion sites, i e  indwelling lines, tubes, and drains  - Monitor endotracheal if appropriate and nasal secretions for changes in amount and color  - [place] appropriate cooling/warming therapies per order  - Administer medications as ordered  - Instruct and encourage patient and family to use good hand hygiene technique  - Identify and instruct in appropriate isolation precautions for identified infection/condition  Outcome: Progressing  Goal: Absence of fever/infection during neutropenic period  Description: INTERVENTIONS:  - Monitor WBC    Outcome: Progressing     Problem: SAFETY ADULT  Goal: Patient will remain free of falls  Description: INTERVENTIONS:  - Educate patient/family on patient safety including physical limitations  - Instruct patient to call for assistance with activity   - Consult OT/PT to assist with strengthening/mobility   - Keep Call bell within reach  - Keep bed low and locked with side rails adjusted as appropriate  - Keep care items and personal belongings within reach  - Initiate and maintain comfort rounds  - Make Fall Risk Sign visible to staff  - Apply yellow socks and bracelet for high fall risk patients  - Consider moving patient to room near nurses station  Outcome: Progressing  Goal: Maintain or return to baseline ADL function  Description: INTERVENTIONS:  -  Assess patient's ability to carry out ADLs; assess patient's baseline for ADL function and identify physical deficits which impact ability to perform ADLs (bathing, care of mouth/teeth, toileting, grooming, dressing, etc )  - Assess/evaluate cause of self-care deficits   - Assess range of motion  - Assess patient's mobility; develop plan if impaired  - Assess patient's need for assistive devices and provide as appropriate  - Encourage maximum independence but intervene and supervise when necessary  - Involve family in performance of ADLs  - Assess for home care needs following discharge   - Consider OT consult to assist with ADL evaluation and planning for discharge  - Provide patient education as appropriate  Outcome: Progressing  Goal: Maintains/Returns to pre admission functional level  Description: INTERVENTIONS:  - Perform BMAT or MOVE assessment daily    - Set and communicate daily mobility goal to care team and patient/family/caregiver  - Collaborate with rehabilitation services on mobility goals if consulted  - Out of bed for toileting  - Record patient progress and toleration of activity level   Outcome: Progressing     Problem: Knowledge Deficit  Goal: Patient/family/caregiver demonstrates understanding of disease process, treatment plan, medications, and discharge instructions  Description: Complete learning assessment and assess knowledge base  Interventions:  - Provide teaching at level of understanding  - Provide teaching via preferred learning methods  Outcome: Progressing  Goal: Verbalizes understanding of labor plan  Description: Assess patient/family/caregiver's baseline knowledge level and ability to understand information    Provide education via patient/family/caregiver's preferred learning method at appropriate level of understanding  1  Provide teaching at level of understanding  2  Provide teaching via preferred learning method(s)  Outcome: Progressing     Problem: DISCHARGE PLANNING  Goal: Discharge to home or other facility with appropriate resources  Description: INTERVENTIONS:  - Identify barriers to discharge w/patient and caregiver  - Arrange for needed discharge resources and transportation as appropriate  - Identify discharge learning needs (meds, wound care, etc )  - Arrange for interpretive services to assist at discharge as needed  - Refer to Case Management Department for coordinating discharge planning if the patient needs post-hospital services based on physician/advanced practitioner order or complex needs related to functional status, cognitive ability, or social support system  Outcome: Progressing     Problem: Labor & Delivery  Goal: Manages discomfort  Description: Assess and monitor for signs and symptoms of discomfort  Assess patient's pain level regularly and per hospital policy  Administer medications as ordered  Support use of nonpharmacological methods to help control pain such as distraction, imagery, relaxation, and application of heat and cold  Collaborate with interdisciplinary team and patient to determine appropriate pain management plan  1  Include patient in decisions related to comfort  2  Offer non-pharmacological pain management interventions  3  Report ineffective pain management to physician  Outcome: Progressing  Goal: Patient vital signs are stable  Description: 1  Assess vital signs - vaginal delivery    Outcome: Progressing

## 2021-08-17 NOTE — OB LABOR/OXYTOCIN SAFETY PROGRESS
Oxytocin Safety Progress Check Note - Myrtie Cornea 36 y o  female MRN: [id number]    Unit/Bed#: -01 Encounter: [id number]    Dose (christin-units/min) Oxytocin: 6 christin-units/min  Contraction Frequency (minutes): 2-3 5  Contraction Quality: Mild  Tachysystole: No   Cervical Dilation: 4        Cervical Effacement: 80  Fetal Station: -1  Baseline Rate: 125 bpm  Fetal Heart Rate: 140 BPM  FHR Category: Category II               Vital Signs:   Vitals:    08/17/21 0228   BP: 107/57   Pulse: 85   Resp:    Temp:    SpO2:            Notes/comments:    Due for check at this time  SVE 4/80/-1  AROM for clear fluid  FHT baseline 1630, moderate variability, category I tracing at this time  Paty Q 2 on toco  IUPC placed at this time       Elvis Christian MD 8/17/2021 2:45 AM

## 2021-08-17 NOTE — OB LABOR/OXYTOCIN SAFETY PROGRESS
Oxytocin Safety Progress Check Note - Viktor Castillo 36 y o  female MRN: [id number]    Unit/Bed#: -01 Encounter: [id number]    Dose (christin-units/min) Oxytocin: 4 christin-units/min  Contraction Frequency (minutes): 1-3 5, irregular  Contraction Quality: Mild  Tachysystole: No   Cervical Dilation: 4        Cervical Effacement: 80  Fetal Station: -2  Baseline Rate: 125 bpm  Fetal Heart Rate: 155 BPM  FHR Category: Category I               Vital Signs:   Vitals:    08/16/21 2142   BP: 117/73   Pulse: 93   Resp:    Temp:    SpO2:            Notes/comments:    Two hours and Pitocin titration started  SVE at this time 4/80/-2  FHT baseline 130, moderate variability, category 1 tracing at this time  Paty Q2-4 on toco   Pitocin currently at 4 milliunits per minute  Patient becoming very uncomfortable will plan for epidural and to continue Pitocin titration  Dr Antonio Rapp aware      Pooja Castro MD 8/16/2021 9:54 PM

## 2021-08-17 NOTE — PLAN OF CARE
Problem: PAIN - ADULT  Goal: Verbalizes/displays adequate comfort level or baseline comfort level  Description: Interventions:  - Encourage patient to monitor pain and request assistance  - Assess pain using appropriate pain scale  - Administer analgesics based on type and severity of pain and evaluate response  - Implement non-pharmacological measures as appropriate and evaluate response  - Consider cultural and social influences on pain and pain management  - Notify physician/advanced practitioner if interventions unsuccessful or patient reports new pain  Outcome: Progressing     Problem: INFECTION - ADULT  Goal: Absence or prevention of progression during hospitalization  Description: INTERVENTIONS:  - Assess and monitor for signs and symptoms of infection  - Monitor lab/diagnostic results  - Monitor all insertion sites, i e  indwelling lines, tubes, and drains  - Monitor endotracheal if appropriate and nasal secretions for changes in amount and color  - [place] appropriate cooling/warming therapies per order  - Administer medications as ordered  - Instruct and encourage patient and family to use good hand hygiene technique  - Identify and instruct in appropriate isolation precautions for identified infection/condition  Outcome: Progressing  Goal: Absence of fever/infection during neutropenic period  Description: INTERVENTIONS:  - Monitor WBC    Outcome: Progressing     Problem: SAFETY ADULT  Goal: Patient will remain free of falls  Description: INTERVENTIONS:  - Educate patient/family on patient safety including physical limitations  - Instruct patient to call for assistance with activity   - Consult OT/PT to assist with strengthening/mobility   - Keep Call bell within reach  - Keep bed low and locked with side rails adjusted as appropriate  - Keep care items and personal belongings within reach  - Initiate and maintain comfort rounds  - Make Fall Risk Sign visible to staff  - Offer Toileting every  Hours, in advance of need  - Initiate/Maintain alarm  - Obtain necessary fall risk management equipment:   - Apply yellow socks and bracelet for high fall risk patients  - Consider moving patient to room near nurses station  Outcome: Progressing  Goal: Maintain or return to baseline ADL function  Description: INTERVENTIONS:  -  Assess patient's ability to carry out ADLs; assess patient's baseline for ADL function and identify physical deficits which impact ability to perform ADLs (bathing, care of mouth/teeth, toileting, grooming, dressing, etc )  - Assess/evaluate cause of self-care deficits   - Assess range of motion  - Assess patient's mobility; develop plan if impaired  - Assess patient's need for assistive devices and provide as appropriate  - Encourage maximum independence but intervene and supervise when necessary  - Involve family in performance of ADLs  - Assess for home care needs following discharge   - Consider OT consult to assist with ADL evaluation and planning for discharge  - Provide patient education as appropriate  Outcome: Progressing  Goal: Maintains/Returns to pre admission functional level  Description: INTERVENTIONS:  - Perform BMAT or MOVE assessment daily    - Set and communicate daily mobility goal to care team and patient/family/caregiver  - Collaborate with rehabilitation services on mobility goals if consulted  - Perform Range of Motion  times a day  - Reposition patient every  hours    - Dangle patient  times a day  - Stand patient  times a day  - Ambulate patient  times a day  - Out of bed to chair  times a day   - Out of bed for meals  times a day  - Out of bed for toileting  - Record patient progress and toleration of activity level   Outcome: Progressing     Problem: DISCHARGE PLANNING  Goal: Discharge to home or other facility with appropriate resources  Description: INTERVENTIONS:  - Identify barriers to discharge w/patient and caregiver  - Arrange for needed discharge resources and transportation as appropriate  - Identify discharge learning needs (meds, wound care, etc )  - Arrange for interpretive services to assist at discharge as needed  - Refer to Case Management Department for coordinating discharge planning if the patient needs post-hospital services based on physician/advanced practitioner order or complex needs related to functional status, cognitive ability, or social support system  Outcome: Progressing     Problem: POSTPARTUM  Goal: Experiences normal postpartum course  Description: INTERVENTIONS:  - Monitor maternal vital signs  - Assess uterine involution and lochia  Outcome: Progressing  Goal: Appropriate maternal -  bonding  Description: INTERVENTIONS:  - Identify family support  - Assess for appropriate maternal/infant bonding   -Encourage maternal/infant bonding opportunities  - Referral to  or  as needed  Outcome: Progressing  Goal: Establishment of infant feeding pattern  Description: INTERVENTIONS:  - Assess breast/bottle feeding  - Refer to lactation as needed  Outcome: Progressing  Goal: Incision(s), wounds(s) or drain site(s) healing without S/S of infection  Description: INTERVENTIONS  - Assess and document dressing, incision, wound bed, drain sites and surrounding tissue  - Provide patient and family education  - Perform skin care/dressing changes every   Outcome: Progressing

## 2021-08-17 NOTE — LACTATION NOTE
This note was copied from a baby's chart  CONSULT - LACTATION  Baby Girl Alma Taylor Hector 0 days female MRN: [id number]    [facility]UR[facility] Room / Bed: (N)/(N) Encounter: [id number]    Maternal Information     MOTHER:  Doug Eller  Maternal Age: 36 y o    OB History: # 1 - Date: 21, Sex: Female, Weight: 3335 g (7 lb 5 6 oz), GA: 39w1d, Delivery: Vaginal, Spontaneous, Apgar1: 9, Apgar5: 9, Living: Living, Birth Comments: None   Previouse breast reduction surgery? No    Lactation history:   Has patient previously breast fed: No   How long had patient previously breast fed:     Previous breast feeding complications:       Past Surgical History:   Procedure Laterality Date    WISDOM TOOTH EXTRACTION      WISDOM TOOTH EXTRACTION          Birth information:  YOB: 2019   Time of birth: 5:23 AM   Sex: female   Delivery type: Vaginal, Spontaneous   Birth Weight: 3335 g (7 lb 5 6 oz)   Percent of Weight Change: 0%     Gestational Age: 36w3d   [unfilled]    Assessment     Breast and nipple assessment: symmetrical firm breasts with dark areolas and everted nipples    [place] Assessment: receded chin    Feeding assessment: latch difficulty (due to alignment of nipple to mouth  education provided)  LATCH:  Latch: Repeated attempts, hold nipple in mouth, stimulate to suck   Audible Swallowing: Spontaneous and intermittent (24 hours old)   Type of Nipple: Everted (After stimulation)   Comfort (Breast/Nipple): Soft/non-tender   Hold (Positioning): Partial assist, teach one side, mother does other, staff holds   LATCH Score: 8          Feeding recommendations:  breast feed on demand  Mom was attempting latch at time of consult  Suha was fussy at the breast  Mom was laid back and attempting cross cradle  Education provided on positions for breastfeeding  Assisted in laid back position  Alignment of nipple to the nose, drag down to chin   Chin tucked deeply into breast  To extend neck and create wide gape for deep latch  Suha latched to the left breast and then right breast with deep latch and audible swallows  Encouraged to continue to feed skin to skin, offer both breasts at every breast feeding session, and look for signs of satiation  Mom has an S2 at home  Met with mother  Provided mother with Ready, Set, Baby booklet  Discussed Skin to Skin contact an benefits to mom and baby  Talked about the delay of the first bath until baby has adjusted  Spoke about the benefits of rooming in  Feeding on cue and what that means for recognizing infant's hunger  Avoidance of pacifiers for the first month discussed  Talked about exclusive breastfeeding for the first 6 months  Positioning and latch reviewed as well as showing images of other feeding positions  Discussed the properties of a good latch in any position  Reviewed hand/manual expression  Discussed s/s that baby is getting enough milk and some s/s that breastfeeding dyad may need further help  Gave information on common concerns, what to expect the first few weeks after delivery, preparing for other caregivers, and how partners can help  Resources for support also provided  Information on hand expression given  Discussed benefits of knowing how to manually express breast including stimulating milk supply, softening nipple for latch and evacuating breast in the event of engorgement      Worked on positioning infant up at chest level and starting to feed infant with nose arriving at the nipple  Then, using areolar compression to achieve a deep latch that is comfortable and exchanges optimum amounts of milk   -     Start feedings on breast that last feeding ended   - allow no more than 3 hours between breast feeding sessions   - time between feedings is counted from the beginning of the first feed to the beginning of the next feeding sessionMom is encouraged to place baby skin to skin for feedings  Skin to skin education provided for baby placement on mother's chest, baby only in diaper, blankets below shoulders on baby's back  Skin to skin is encouraged to continue at home for feedings and between feedings  Encouraged parents to call for assistance, questions, and concerns about breastfeeding  Extension provided      Gracie Funez 8/17/2021 2:24 PM

## 2021-08-17 NOTE — L&D DELIVERY NOTE
PATIENT IDENTIFICATION   this is a 53-year-old white female, she is a  1 para 0, her EDC is 2021  She was seen in triage on multiple days with contractions and pressure  It was finally decided on the evening of the  to induce her labor  At that time she was 3 5 cm 60% -2 fetal heart rate tracing was a category 1  Her GBS was positive  The plan was to start Pitocin, and epidural when necessary  An GBS prophylaxis with penicillin  HOSPITAL COURSE    after admission on the evening of the  Pitocin was started approximately   She had a slow but steady response  Fetal heart rate tracings were reassuring  She became more uncomfortable  Approximately at  she was evaluated by anesthesia and an epidural was given  She continued to make slow progress through the early morning of the   She was completely dilated at 5:40 a m     Again fetal heart rate tracings were reassuring  The epidural was effective  She began pushing up the infant being  One in a direct OA position  With delivery restituted to a our OT position  There was a nuchal cord that was tight but was easy to reduce on the perineum  The anterior shin was delivered 1st with there is the left shoulder  The posterior shoulder right shoulder without difficulty the  The infant breathed and cried spontaneously  After 1 minute the cord was clamped and cut  Gases were obtained  There was a second-degree laceration of the perineum that was identified  There were no periurethral tears  The second-degree laceration of the posterior vaginal close was reapproximated using 3-0 repeat  Bleeding was well controlled  The placenta was delivered approximately [phone]  Wheezes entirety it was central located  All the condom leaves were present  The bleeding was well controlled  The QBL was being calculated  At completion mother and infant were doing well  Instruments and sponge counts were correct

## 2021-08-17 NOTE — ANESTHESIA POSTPROCEDURE EVALUATION
Post-Op Assessment Note    CV Status:  Stable       Mental Status:  Alert and awake   Hydration Status:  Stable   PONV Controlled:  None   Airway Patency:  Patent      Post Op Vitals Reviewed: Yes      Staff: CRNA     Post-op block assessment: catheter intact and no complications      No complications documented  /72 (08/17/21 1000)    Temp 98 4 °F (36 9 °C) (08/17/21 1000)    Pulse 102 (08/17/21 1000)   Resp 18 (08/17/21 1000)    SpO2      Epidural removed without incident, patient satisfied

## 2021-08-17 NOTE — DISCHARGE SUMMARY
Discharge Summary - Bobby Howell 36 y o  female MRN: [id number]    Unit/Bed#: -01 Encounter: [id number]    Admission Date: 2021     Discharge Date: 21    Admitting Diagnosis:   1) Pregnancy at 39w1d  2) GBS positive      Discharge Diagnosis:   Same, delivered    Procedures:   spontaneous vaginal delivery    Admitting Attending: Dr Mary Mckinnon MD  Delivery Attending: Dr Mary Mckinnon MD  Discharge Attending: Dr Mary Mckinnon MD    Hospital Course: Bobby Howell is a 36 y o  Tavo Guess who was admitted at 36w3d for elective induction of labor  She made continuous cervical change and received an epidural for pain control  She was AROM'd for clear moderate fluid at 0238  She proceeded to make cervical change and became complete at 0540  She started pushing at 0548  She then underwent an uncomplicated spontaneous vaginal delivery and delivered a viable female  at term  APGARS were 9, 9 at 1 and 5 minutes, respectively   weighed 7lb 5 6oz  Placenta was delivered at [phone]   was then transferred to  nursery  Patient tolerated the procedure well and was transferred to recovery in stable condition  The patient's post partum course was unremarkable  On day of discharge, she was ambulating and able to reasonably perform all ADLs  She was voiding and had appropriate bowel function  Pain was well controlled  She was discharged home on postpartum day #1 without complications  Patient was instructed to follow up with her OB as an outpatient and was given appropriate warnings to call provider if she develops signs of infection or uncontrolled pain  On day of discharge she was ambulating, voiding spontaneously, tolerating oral intake and hemodynamically stable  She is breast feeding   Mom's blood type is A positive   RhoGAM is not indicated    Condition at discharge:   good     Disposition:   Home    Planned Readmission:   No    Discharge Medications:   Prenatal vitamin daily for 6 months or the duration of nursing whichever is longer  Motrin 600 mg orally every 6 hours as needed for pain  Tylenol (over the counter) per bottle directions as needed for pain  Hydrocortisone cream 1% (over the counter) applied 1-2x daily to hemorrhoids as needed  Witch hazel pads for hemorrhoidal discomfort as needed      Discharge instructions :   -Do not place anything (no partner, tampons or douche) in your vagina for 6 weeks  -You may walk for exercise for the first 6 weeks then gradually return to your usual activities    -Please do not drive for 1 week if you have no stitches and for 2 weeks if you have stitches or underwent a  delivery     -You may take baths or shower per your preference    -Please look at your bust (breasts) in the mirror daily and call provider for redness or tenderness or increased warmth  - If you have had a  please look at your incision daily as well and call provider for increasing redness or steady drainage from the incision    -Please call your provider if temperature > 100 4*F or 38* C, worsening pain or a foul discharge        Elizabeth Gonzalez MD  21  3:12 PM

## 2021-08-17 NOTE — OB LABOR/OXYTOCIN SAFETY PROGRESS
Oxytocin Safety Progress Check Note - Malika Villarreal 36 y o  female MRN: [id number]    Unit/Bed#: -01 Encounter: [id number]    Dose (christin-units/min) Oxytocin: 4 christin-units/min  Contraction Frequency (minutes): 2 5-4  Contraction Quality: Mild  Tachysystole: No   Cervical Dilation: 4        Cervical Effacement: 80  Fetal Station: -2  Baseline Rate: 130 bpm  Fetal Heart Rate: 135 BPM  FHR Category: Category II               Vital Signs:   Vitals:    08/16/21 2328   BP: 98/54   Pulse: 88   Resp:    Temp:    SpO2:            Notes/comments:    Two hours since last check  SVE at this time 4/80/-2  FHT baseline 120, moderate variability, occasional late decelerations   Paty Q3-5 on Keeley Martino MD 8/16/2021 11:59 PM

## 2021-08-17 NOTE — ANESTHESIA PREPROCEDURE EVALUATION
Procedure:  LABOR ANALGESIA    Relevant Problems   GYN   (+) 39 weeks gestation of pregnancy        Physical Exam    Airway    Mallampati score: II  TM Distance: >3 FB  Neck ROM: full     Dental       Cardiovascular      Pulmonary      Other Findings        Anesthesia Plan  ASA Score- 2     Anesthesia Type- epidural with ASA Monitors  Additional Monitors:   Airway Plan:           Plan Factors-    Chart reviewed  Induction- intravenous  Postoperative Plan-     Informed Consent- Anesthetic plan and risks discussed with patient  I personally reviewed this patient with the CRNA  Discussed and agreed on the Anesthesia Plan with the CRNA  Rosella Goldmann no focal deficits, able to move both lower extremities equally, sensation intact bilaterally/sensation intact/cranial nerves intact/normal strength

## 2021-08-17 NOTE — OB LABOR/OXYTOCIN SAFETY PROGRESS
Oxytocin Safety Progress Check Note - Radu Perez 36 y o  female MRN: [id number]    Unit/Bed#: -01 Encounter: [id number]    Dose (christin-units/min) Oxytocin: 8 christin-units/min  Contraction Frequency (minutes): 1 5-4  Contraction Quality: Mild  Tachysystole: No   Cervical Dilation: Lip/rim (Comment)        Cervical Effacement: 90  Fetal Station: 0  Baseline Rate:  (indeterminant )  Fetal Heart Rate: 130 BPM  FHR Category: Category II               Vital Signs:   Vitals:    08/17/21 0428   BP: 99/56   Pulse: 99   Resp:    Temp:    SpO2:            Notes/comments: At bedside for recurrent late decelerations  SVE at this time 9 5/90/0 with lip on maternal right side  FHT baseline 130, moderate variability, category II tracing at this time  Paty Q2 on toco  Dr Fazal Brumfield aware and en route        Laura Lobato MD 8/17/2021 4:51 AM

## 2021-08-18 VITALS
HEART RATE: 65 BPM | WEIGHT: 210 LBS | TEMPERATURE: 98.4 F | DIASTOLIC BLOOD PRESSURE: 69 MMHG | BODY MASS INDEX: 34.99 KG/M2 | HEIGHT: 65 IN | SYSTOLIC BLOOD PRESSURE: 110 MMHG | RESPIRATION RATE: 18 BRPM | OXYGEN SATURATION: 100 %

## 2021-08-18 PROBLEM — Z3A.39 39 WEEKS GESTATION OF PREGNANCY: Status: RESOLVED | Noted: 2021-08-15 | Resolved: 2021-08-18

## 2021-08-18 PROCEDURE — 99024 POSTOP FOLLOW-UP VISIT: CPT | Performed by: OBSTETRICS & GYNECOLOGY

## 2021-08-18 RX ORDER — IBUPROFEN 600 MG/1
600 TABLET ORAL EVERY 6 HOURS PRN
Qty: 30 TABLET | Refills: 0
Start: 2021-08-18 | End: 2022-05-16 | Stop reason: ALTCHOICE

## 2021-08-18 RX ORDER — DIAPER,BRIEF,INFANT-TODD,DISP
1 EACH MISCELLANEOUS 4 TIMES DAILY PRN
Qty: 30 G | Refills: 0 | Status: CANCELLED
Start: 2021-08-18

## 2021-08-18 RX ORDER — ACETAMINOPHEN 325 MG/1
650 TABLET ORAL EVERY 6 HOURS PRN
Refills: 0
Start: 2021-08-18 | End: 2022-05-16 | Stop reason: ALTCHOICE

## 2021-08-18 RX ORDER — DOCUSATE SODIUM 100 MG/1
100 CAPSULE, LIQUID FILLED ORAL 2 TIMES DAILY
Qty: 10 CAPSULE | Refills: 0 | Status: CANCELLED
Start: 2021-08-18

## 2021-08-18 RX ADMIN — IBUPROFEN 600 MG: 600 TABLET ORAL at 12:06

## 2021-08-18 RX ADMIN — DOCUSATE SODIUM 100 MG: 100 CAPSULE ORAL at 12:06

## 2021-08-18 NOTE — PROGRESS NOTES
Progress Note - OB/GYN   Ryan Clifford 36 y o  female MRN: [id number]  Unit/Bed#:  318-01 Encounter: [id number]    Assessment:  Post partum Day #1 s/pSVD, stable, baby in room    Plan:  1  Continue routine post partum care   Encourage ambulation   Encourage breastfeeding   Continue current medications   Anticipate discharge PPD 1 or 2     Subjective/Objective   Chief Complaint:     Post delivery  Patient is doing well  Lochia WNL  Pain well controlled  Subjective:     Pain: yes, cramping, improved with meds  Tolerating PO: yes  Voiding: yes  Flatus: yes  BM: no  Ambulating: yes  Chest pain: no  Shortness of breath: no  Leg pain: no  Lochia: minimal    Objective:     Vitals: BP 94/51   Pulse 73   Temp 98 8 °F (37 1 °C) (Oral)   Resp 18   Ht 5' 5" (1 651 m)   Wt 95 3 kg (210 lb)   LMP 11/06/2020 (Exact Date)   SpO2 100%   Breastfeeding Yes   BMI 34 95 kg/m²       Intake/Output Summary (Last 24 hours) at 8/18/2021 0554  Last data filed at 8/17/2021 1557  Gross per 24 hour   Intake --   Output 1583 ml   Net -1583 ml       Lab Results   Component Value Date    WBC 13 32 (H) 08/16/2021    HGB 12 8 08/16/2021    HCT 39 4 08/16/2021    MCV 92 08/16/2021     08/16/2021       Physical Exam:     Gen: AAOx3, NAD  CV: RRR  Lungs: CTA b/l  Abd: Soft, non-tender, non-distended, no rebound or guarding  Uterine fundus firm and non-tender, at the umbilicus     Ext: Non tender    Nathan Ortega MD  8/18/2021  5:54 AM

## 2021-08-18 NOTE — LACTATION NOTE
This note was copied from a baby's chart  Met with mother to go over discharge breastfeeding booklet including the feeding log  Emphasized 8 or more (12) feedings in a 24 hour period, what to expect for the number of diapers per day of life and the progression of properties of the  stooling pattern  Reviewed breastfeeding and your lifestyle, storage and preparation of breast milk, how to keep you breast pump clean, the employed breastfeeding mother and paced bottle feeding handouts  Booklet included Breastfeeding Resources for after discharge including access to the number for the [address]  Discussed s/s engorgement and how to manage with medications, additional feedings at the breast or pumping sessions as needed, and cool compresses as well as s/s and management of mastitis and when to contact physician

## 2021-08-18 NOTE — PLAN OF CARE
Problem: PAIN - ADULT  Goal: Verbalizes/displays adequate comfort level or baseline comfort level  Description: Interventions:  - Encourage patient to monitor pain and request assistance  - Assess pain using appropriate pain scale  - Administer analgesics based on type and severity of pain and evaluate response  - Implement non-pharmacological measures as appropriate and evaluate response  - Consider cultural and social influences on pain and pain management  - Notify physician/advanced practitioner if interventions unsuccessful or patient reports new pain  Outcome: Progressing     Problem: INFECTION - ADULT  Goal: Absence or prevention of progression during hospitalization  Description: INTERVENTIONS:  - Assess and monitor for signs and symptoms of infection  - Monitor lab/diagnostic results  - Monitor all insertion sites, i e  indwelling lines, tubes, and drains  - Monitor endotracheal if appropriate and nasal secretions for changes in amount and color  - [place] appropriate cooling/warming therapies per order  - Administer medications as ordered  - Instruct and encourage patient and family to use good hand hygiene technique  - Identify and instruct in appropriate isolation precautions for identified infection/condition  Outcome: Progressing  Goal: Absence of fever/infection during neutropenic period  Description: INTERVENTIONS:  - Monitor WBC    Outcome: Progressing     Problem: SAFETY ADULT  Goal: Patient will remain free of falls  Description: INTERVENTIONS:  - Educate patient/family on patient safety including physical limitations  - Instruct patient to call for assistance with activity   - Consult OT/PT to assist with strengthening/mobility   - Keep Call bell within reach  - Keep bed low and locked with side rails adjusted as appropriate  - Keep care items and personal belongings within reach  - Initiate and maintain comfort rounds  - Make Fall Risk Sign visible to staff  - Offer Toileting in advance of need  - Initiate/Maintain alarms  - Obtain necessary fall risk management equipment:   - Apply yellow socks and bracelet for high fall risk patients  - Consider moving patient to room near nurses station  Outcome: Progressing  Goal: Maintain or return to baseline ADL function  Description: INTERVENTIONS:  -  Assess patient's ability to carry out ADLs; assess patient's baseline for ADL function and identify physical deficits which impact ability to perform ADLs (bathing, care of mouth/teeth, toileting, grooming, dressing, etc )  - Assess/evaluate cause of self-care deficits   - Assess range of motion  - Assess patient's mobility; develop plan if impaired  - Assess patient's need for assistive devices and provide as appropriate  - Encourage maximum independence but intervene and supervise when necessary  - Involve family in performance of ADLs  - Assess for home care needs following discharge   - Consider OT consult to assist with ADL evaluation and planning for discharge  - Provide patient education as appropriate  Outcome: Progressing  Goal: Maintains/Returns to pre admission functional level  Description: INTERVENTIONS:  - Perform BMAT or MOVE assessment daily    - Set and communicate daily mobility goal to care team and patient/family/caregiver     - Collaborate with rehabilitation services on mobility goals if consulted  - Perform Range of Motion   - Reposition patient every   - Dangle patient   - Stand patient   - Ambulate patient   - Out of bed to chair   - Out of bed for meals   - Out of bed for toileting  - Record patient progress and toleration of activity level   Outcome: Progressing     Problem: DISCHARGE PLANNING  Goal: Discharge to home or other facility with appropriate resources  Description: INTERVENTIONS:  - Identify barriers to discharge w/patient and caregiver  - Arrange for needed discharge resources and transportation as appropriate  - Identify discharge learning needs (meds, wound care, etc )  - Arrange for interpretive services to assist at discharge as needed  - Refer to Case Management Department for coordinating discharge planning if the patient needs post-hospital services based on physician/advanced practitioner order or complex needs related to functional status, cognitive ability, or social support system  Outcome: Progressing     Problem: POSTPARTUM  Goal: Experiences normal postpartum course  Description: INTERVENTIONS:  - Monitor maternal vital signs  - Assess uterine involution and lochia  Outcome: Progressing  Goal: Appropriate maternal -  bonding  Description: INTERVENTIONS:  - Identify family support  - Assess for appropriate maternal/infant bonding   -Encourage maternal/infant bonding opportunities  - Referral to  or  as needed  Outcome: Progressing  Goal: Establishment of infant feeding pattern  Description: INTERVENTIONS:  - Assess breast/bottle feeding  - Refer to lactation as needed  Outcome: Progressing  Goal: Incision(s), wounds(s) or drain site(s) healing without S/S of infection  Description: INTERVENTIONS  - Assess and document dressing, incision, wound bed, drain sites and surrounding tissue  - Provide patient and family education  - Perform skin care/dressing changes   Outcome: Progressing

## 2021-08-18 NOTE — DISCHARGE INSTRUCTIONS
Self Care After Delivery   AMBULATORY CARE:   The postpartum period  is the period of time from delivery to about 6 weeks  During this time you may experience many physical and emotional changes  It is important to understand what is normal and when you need to call your healthcare provider  It is also important to know how to care for yourself during this time  Call your local emergency number (911 in the [address] [facility]) for any of the following:   · You see or hear things that are not there, or have thoughts of harming yourself or your baby  · You soak through 1 pad in 15 minutes, have blurry vision, clammy or pale skin, and feel faint  · You faint or lose consciousness  · You have trouble breathing  · You cough up blood  · Your  incision comes apart  Seek care immediately if:   · Your heart is beating faster than usual     · You have a bad headache or changes in your vision  · Your episiotomy or  incision is red, swollen, bleeding, or draining pus  · You have severe abdominal pain  Call your doctor or obstetrician if:   · Your leg is painful, red, and larger than usual     · You soak through 1 or more pads in an hour, or pass blood clots larger than a quarter from your vagina  · You have a fever  · You have new or worsening pain in your abdomen or vagina  · You continue to have depression 1 to 2 weeks after you deliver  · You have trouble sleeping  · You have foul-smelling discharge from your vagina  · You have pain or burning when you urinate  · You do not have a bowel movement for 3 days or more  · You have nausea or are vomiting  · You have hard lumps or red streaks over your breasts  · You have cracked nipples or bleed from your nipples  · You have questions or concerns about your condition or care  Physical changes:   The following are normal changes after you give birth:  · Pain in the area between your anus and vagina    · Breast pain    · Constipation or hemorrhoids    · Hot or cold flashes    · Vaginal bleeding or discharge    · Mild to moderate abdominal cramping    · Difficulty controlling bowel movements or urine    Emotional changes:  A drop in hormone levels after you deliver may cause changes in your emotions  You may feel irritable, sad, or anxious  You may cry easily or for no reason  You may also feel depressed  Depression that continues can be a sign of postpartum depression, a condition that can be treated  Treatment may include talk therapy, medicines, or both  Healthcare providers will ask how you are feeling and if you have any depression  These talks can happen during appointments for your medical care and for your baby's care, such as well child visits  Providers can help you find ways to care for yourself and your baby  Talk to your providers about the following:  · When emotional changes or depression started, and if it is getting worse over time    · Problems you are having with daily activities, sleep, or caring for your baby    · If anything makes you feel worse, or makes you feel better    · Feeling that you are not bonding with your baby the way you want    · Any problems your baby has with sleeping or feeding    · Your baby is fussy or cries a lot    · Support you have from friends, family, or others    Breast care for breastfeeding mothers: You may have sore breasts for 3 to 6 days after you give birth  This happens as your milk begins to fill your breasts  You may also have sore breasts if you do not breastfeed frequently  Do the following to care for your breasts:  · Apply a moist, warm, compress to your breast as directed  This may help soothe your breasts  Make sure the washcloth is not too hot before you apply it to your breast     · Nurse your baby or pump your milk frequently  This may prevent clogged milk ducts  Ask your healthcare provider how often to nurse or pump      · Massage your breasts as directed  This may help increase your milk flow  Gently rub your breasts in a circular motion before you breastfeed  You may need to gently squeeze your breast or nipple to help release milk  You can also use a breast pump to help release milk from your breast     · Wash your breasts with warm water only  Do not put soap on your nipples  Soap may cause your nipples to become dry  · Apply lanolin cream to your nipples as directed  Lanolin cream may add moisture to your skin and prevent nipple dryness  Always  wash off lanolin cream with warm water before you breastfeed  · Place pads in your bra  Your nipples may leak milk when you are not breastfeeding  You can place pads inside of your bra to help prevent leaking onto your clothing  Ask your healthcare provider where to purchase bra pads  · Get breastfeeding support if needed  Healthcare providers can answer questions about breastfeeding and provide you with support  Ask your healthcare provider who you can contact if you need breastfeeding support  Breast care for non-breastfeeding mothers:  Milk will fill your breasts even if you bottle feed your baby  Do the following to help stop your milk from filling your breasts and causing pain:  · Wear a bra with support at all times  A sports bra or a tight-fitting bra will help stop your milk from coming in  · Apply ice on each breast for 15 to 20 minutes every hour or as directed  Use an ice pack, or put crushed ice in a plastic bag  Cover it with a towel before you apply it to your breast  Ice helps your milk ducts shrink  · Keep your breasts away from warm water  Warm water will make it easier for milk to fill your breasts  Stand with your breasts away from warm water in the shower  · Limit how much you touch your breasts  This will prevent them from filling with milk  Perineum care: Your perineum is the area between your rectum and vagina   It is normal to have swelling and pain in this area after you give birth  If you had an episiotomy, your healthcare provider may give you special instructions  · Clean your perineum after you use the bathroom  This may prevent infection and help with healing  Use a spray bottle with warm water to clean your perineum  You may also gently spray warm water against your perineum when you urinate  Always wipe front to back  · Take a sitz bath as directed  A sitz bath may help relieve swelling and pain  Fill your bath tub or bucket with water up to your hips and sit in the water  Use cold water for 2 days after you deliver  Then use warm water  Ask your healthcare provider for more information about a sitz bath  · Apply ice packs for the first 24 hours or as directed  Use a plastic glove filled with ice or buy an ice pack  Wrap the ice pack or plastic glove in a small towel or wash cloth  Place the ice pack on your perineum for 20 minutes at a time  · Sit on a donut-shaped pillow  This may relieve pressure on your perineum when you sit  · Use wipes that contain medicine or take pills as directed  Your healthcare provider may tell you to use witch hazel pads  You can place witch hazel pads in the refrigerator before you apply them to your perineum  Your provider may also tell you to take NSAIDs  Ask him or her how often to take pills or use the wipes  · Do not go swimming or take tub baths for 4 to 6 weeks or as directed  This will help prevent an infection in your vagina or uterus  Bowel and bladder care: It may take 3 to 5 days to have a bowel movement after you deliver your baby  You can do the following to prevent or manage constipation, and get control of your bowel or bladder:  · Take stool softeners as directed  A stool softener is medicine that will make your bowel movements softer  This may prevent or relieve constipation  A stool softener may also make bowel movements less painful  · Drink plenty of liquids    Ask how much liquid to drink each day and which liquids are best for you  Liquids may help prevent constipation  · Eat foods high in fiber  Examples include fruits, vegetables, grains, beans, and lentils  Ask your healthcare provider how much fiber you need each day  Fiber may prevent constipation  · Do Kegel exercises as directed  Kegel exercises will help strengthen the muscles that control bowel movements and urination  Ask your healthcare provider for more information on Kegel exercises  · Apply cold compresses or medicine to hemorrhoids as directed  This may relieve swelling and pain  Your healthcare provider may tell you to apply ice or wipes that contain medicine to your hemorrhoids  He or she may also tell you to use a sitz bath  Ask your provider for more information on how to manage hemorrhoids  Nutrition:  Good nutrition is important in the postpartum period  It will help you return to a healthy weight, increase your energy levels, and prevent constipation  It will also help you get enough nutrients and calories if you are going to breastfeed your baby  · Eat a variety of healthy foods  Healthy foods include fruits, vegetables, whole-grain breads, low-fat dairy products, beans, lean meats, and fish  You may need 500 to 700 extra calories each day if you breastfeed your baby  You may also need extra protein  · Limit foods with added sugar and high amounts of fat  These foods are high in calories and low in healthy nutrients  Read food labels so you know how much sugar and fat is in the food you want to eat  · Drink 8 to 10 glasses of water per day  Water will help you make plenty of milk for your baby  It will also help prevent constipation  Drink a glass of water every time you breastfeed your baby  · Take vitamins as directed  Ask your healthcare provider what vitamins you need  · Limit caffeine and alcohol if you are breastfeeding    Caffeine and alcohol can get into your breast milk  Caffeine and alcohol can make your baby fussy  They can also interfere with your baby's sleep  Ask your healthcare provider if you can drink alcohol or caffeine  Rest and sleep: You may feel very tired in the postpartum period  Enough sleep will help you heal and give you energy to care for your baby  The following may help you get sleep and rest:  · Nap when your baby naps  Your baby may nap several times during the day  Get rest during this time  · Limit visitors  Many people may want to see you and your baby  Ask friends or family to visit on different days  This will give you time to rest     · Do not plan too much for one day  Put off household chores so that you have time to rest  Gradually do more each day  · Ask for help from family, friends, or neighbors  Ask them to help you with laundry, cleaning, or errands  Also ask someone to watch the baby while you take a nap or relax  Ask your partner to help with the care of your baby  Pump some of your breast milk so your partner can feed your baby during the night  Exercise after delivery:  Wait until your healthcare provider says it is okay to exercise  Exercise can help you lose weight, increase your energy levels, and manage your mood  It can also prevent constipation and blood clots  Start with gentle exercises such as walking  Do more as you have more energy  You may need to avoid abdominal exercises for 1 to 2 weeks after you deliver  Talk to your healthcare provider about an exercise plan that is right for you  Sexual activity after delivery:   · Do not have sex until your healthcare provider says it is okay  You may need to wait 4 to 6 weeks before you have sex  This may prevent infection and allow time to heal     · Your menstrual cycle may begin as soon as 3 weeks after you deliver  Your period may be delayed if you breastfeed your baby  You can become pregnant before you get your first postpartum period   Talk to your healthcare provider about birth control that is right for you  Some types of birth control are not safe during breastfeeding  For support and more information:  Join a support group for new mothers  Ask for help from family and friends with chores, errands, and care of your baby  · Office of Women's Health,  Department of Health and Human Services  [address], [address] [facility]  [address], [address] [facility] , [place] [address]  Phone: [phone]  Web Address: www womenshealth gov  · [facility] [address] [facility] , [address] [facility]  [address] [facility] , [address] [facility]  Web Address: [employer] be  [email or web address]/[email or web address]/[email or web address]-care  aspx  Follow up with your doctor or obstetrician as directed: You will need to follow up within 2 to 6 weeks of delivery  Write down your questions so you remember to ask them at your visits  © Copyright [employer] 2021 Information is for End User's use only and may not be sold, redistributed or otherwise used for commercial purposes  All illustrations and images included in CareNotes® are the copyrighted property of [employer]  or [facility] Miguelito Collins   The above information is an  only  It is not intended as medical advice for individual conditions or treatments  Talk to your doctor, nurse or pharmacist before following any medical regimen to see if it is safe and effective for you

## 2021-08-24 LAB — PLACENTA IN STORAGE: NORMAL

## 2021-09-08 ENCOUNTER — POSTPARTUM VISIT (OUTPATIENT)
Dept: OBGYN CLINIC | Facility: CLINIC | Age: 31
End: 2021-09-08

## 2021-09-08 VITALS
DIASTOLIC BLOOD PRESSURE: 70 MMHG | WEIGHT: 188 LBS | SYSTOLIC BLOOD PRESSURE: 102 MMHG | BODY MASS INDEX: 31.32 KG/M2 | HEIGHT: 65 IN

## 2021-09-08 PROCEDURE — 99024 POSTOP FOLLOW-UP VISIT: CPT | Performed by: OBSTETRICS & GYNECOLOGY

## 2021-09-08 RX ORDER — DOCUSATE SODIUM 100 MG/1
100 CAPSULE, LIQUID FILLED ORAL 2 TIMES DAILY PRN
COMMUNITY
End: 2022-05-16 | Stop reason: ALTCHOICE

## 2021-09-08 NOTE — PROGRESS NOTES
3 WK POSTPARTUM APPT:    SAVD (39 1/7 wk) 2021    7 lbs 5 6 oz    Pit induction, AROM, nuchal cord (reduced)  "VLADISLAV"    Breastfeeding q 2-4 hrs, some cluster feedings recently in aft - good latch, good milk supply - plans long term  Lochia - brown spotting  Normal bowel/bladder habits, taking Colace prn  Taking post-rachelle vits  Ped - [facility] -  jaundice, gaining weight - has 1 month appt sched  (+) GBS - tx in labor  Had TDAP during pregnancy  Hgb 2021 = 12 8  Completed [place] Dep scale - score = 7  Birth control - discussed progesterone ocps, Depo Provera, Mirena IUD - pt also inquiring about Nexplanon (brochure given)  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

## 2021-09-21 ENCOUNTER — TELEPHONE (OUTPATIENT)
Dept: OBGYN CLINIC | Facility: CLINIC | Age: 31
End: 2021-09-21

## 2021-09-21 NOTE — TELEPHONE ENCOUNTER
Pt interested in [facility] IUD for birth control - can have inserted @ 6 wk postpartum appt (9/28/2021)/JEAN PIERRE  Pt has abstained from intercourse since delivery & will continue to abstain until appt

## 2021-09-23 ENCOUNTER — TELEPHONE (OUTPATIENT)
Dept: OBGYN CLINIC | Facility: CLINIC | Age: 31
End: 2021-09-23

## 2021-09-28 ENCOUNTER — POSTPARTUM VISIT (OUTPATIENT)
Dept: OBGYN CLINIC | Facility: CLINIC | Age: 31
End: 2021-09-28
Payer: COMMERCIAL

## 2021-09-28 VITALS
BODY MASS INDEX: 31.36 KG/M2 | WEIGHT: 188.2 LBS | HEIGHT: 65 IN | DIASTOLIC BLOOD PRESSURE: 66 MMHG | SYSTOLIC BLOOD PRESSURE: 114 MMHG

## 2021-09-28 DIAGNOSIS — Z30.430 ENCOUNTER FOR IUD INSERTION: Primary | ICD-10-CM

## 2021-09-28 PROCEDURE — 58300 INSERT INTRAUTERINE DEVICE: CPT | Performed by: OBSTETRICS & GYNECOLOGY

## 2021-09-28 PROCEDURE — 99024 POSTOP FOLLOW-UP VISIT: CPT | Performed by: OBSTETRICS & GYNECOLOGY

## 2021-09-28 NOTE — PROGRESS NOTES
This is a 68-year-old white male the we will  [place] Grime  She was seen  The we will  Denies any problem depression anxiety  Denies any major  GI complaint  She is now requesting the 3 year IUD placement  Examination of breasts consistent with nursing  The abdomen is all or masses  Pelvic exam uses small mobile anterior of the adnexa clear bilaterally  Consent was given for IUD insertion  Please see note below  Iud insertions    Date/Time: 9/28/2021 1:51 PM  Performed by: Tamar Uriostegui MD  Authorized by: Tamar Uriostegui MD   Universal Protocol:  Timeout called at: 9/28/2021 1:40 PM   Patient understanding: patient states understanding of the procedure being performed  Patient consent: the patient's understanding of the procedure matches consent given  Relevant documents: relevant documents present and verified  Test results: test results available and properly labeled  Site marked: the operative site was not marked  Radiology Images displayed and confirmed  If images not available, report reviewed: imaging studies not available  Patient identity confirmed: verbally with patient        Procedure:     Pelvic exam performed: yes      Cervix cleaned and prepped: yes      Speculum placed in vagina: yes      Tenaculum applied to cervix: yes      Uterus sounded: yes      Uterus sound depth (cm):  8    IUD inserted with no complications: yes      IUD type:  Brooke    Strings trimmed: yes    Post-procedure:     Patient tolerated procedure well: yes      Patient will follow up after next period: yes    Comments: The patient was seen today for 6 week postpartum checkup status post vaginal delivery  She is doing well  Nursing is going well  There is no problem for postpartum depression or baby blues  She is also requesting the IUD for contraception  As stated above the the 3 year IUD was inserted without difficulty  Transabdominal ultrasound showed proper placement without on evidence of perforation  The string was cut  Instruction booklet was given  She return my office in 5 weeks for IUD follow-up  She will continue nursing

## 2021-09-28 NOTE — PATIENT INSTRUCTIONS
Patient doing well nursing is going well  IUD was inserted without problems  The brochure was given  Return to office in by you weeks for IUD follow-up

## 2021-11-02 ENCOUNTER — OFFICE VISIT (OUTPATIENT)
Dept: OBGYN CLINIC | Facility: CLINIC | Age: 31
End: 2021-11-02
Payer: COMMERCIAL

## 2021-11-02 VITALS
WEIGHT: 184.4 LBS | BODY MASS INDEX: 30.72 KG/M2 | SYSTOLIC BLOOD PRESSURE: 110 MMHG | DIASTOLIC BLOOD PRESSURE: 80 MMHG | HEIGHT: 65 IN

## 2021-11-02 DIAGNOSIS — N95.2 ATROPHIC VAGINITIS: ICD-10-CM

## 2021-11-02 DIAGNOSIS — Z30.431 IUD CHECK UP: Primary | ICD-10-CM

## 2021-11-02 PROCEDURE — 99213 OFFICE O/P EST LOW 20 MIN: CPT | Performed by: OBSTETRICS & GYNECOLOGY

## 2021-11-02 RX ORDER — ESTRADIOL 0.1 MG/G
CREAM VAGINAL
Qty: 42.5 G | Refills: 2 | Status: SHIPPED | OUTPATIENT
Start: 2021-11-02 | End: 2022-05-16 | Stop reason: ALTCHOICE

## 2022-05-16 ENCOUNTER — OFFICE VISIT (OUTPATIENT)
Dept: INTERNAL MEDICINE CLINIC | Facility: CLINIC | Age: 32
End: 2022-05-16
Payer: COMMERCIAL

## 2022-05-16 VITALS
HEART RATE: 65 BPM | SYSTOLIC BLOOD PRESSURE: 100 MMHG | OXYGEN SATURATION: 99 % | WEIGHT: 167.6 LBS | HEIGHT: 65 IN | BODY MASS INDEX: 27.92 KG/M2 | DIASTOLIC BLOOD PRESSURE: 64 MMHG | TEMPERATURE: 97.1 F

## 2022-05-16 DIAGNOSIS — Z00.00 ENCOUNTER FOR WELL ADULT EXAM WITHOUT ABNORMAL FINDINGS: ICD-10-CM

## 2022-05-16 DIAGNOSIS — Z13.228 SCREENING FOR METABOLIC DISORDER: Primary | ICD-10-CM

## 2022-05-16 PROCEDURE — 1036F TOBACCO NON-USER: CPT | Performed by: NURSE PRACTITIONER

## 2022-05-16 PROCEDURE — 3008F BODY MASS INDEX DOCD: CPT | Performed by: NURSE PRACTITIONER

## 2022-05-16 PROCEDURE — 99395 PREV VISIT EST AGE 18-39: CPT | Performed by: NURSE PRACTITIONER

## 2022-05-16 PROCEDURE — 3725F SCREEN DEPRESSION PERFORMED: CPT | Performed by: NURSE PRACTITIONER

## 2022-05-16 NOTE — PROGRESS NOTES
[facility] [place]  Well Adult Female Physical Visit  Patient ID: Darion Calderon    : 1990  Age/Gender: 29 y o  female     DATE: 2022  BMI Counseling: Body mass index is 27 76 kg/m²  The BMI is above normal  Nutrition recommendations include decreasing portion sizes, encouraging healthy choices of fruits and vegetables, decreasing fast food intake, consuming healthier snacks, limiting drinks that contain sugar, moderation in carbohydrate intake, increasing intake of lean protein, reducing intake of saturated and trans fat and reducing intake of cholesterol  Exercise recommendations include moderate physical activity 150 minutes/week and exercising 3-5 times per week  Rationale for BMI follow-up plan is due to patient being overweight or obese  Depression Screening and Follow-up Plan: Patient was screened for depression during today's encounter  They screened negative with a PHQ-2 score of 0  Assessment/Plan:    Encounter for well adult exam without abnormal findings  Advised patient to continue with well-balanced diet and daily exercise  Will get updated fasting blood work  Up-to-date with vaccinations, up-to-date with cervical cancer screening, encouraged monthly self-breast examination, follow-up in 1 year or sooner as needed for annual physical        Diagnoses and all orders for this visit:    Screening for metabolic disorder  -     Comprehensive metabolic panel; Future  -     CBC and differential  -     Lipid panel    Encounter for well adult exam without abnormal findings          Subjective:      Darion Calderon is a 29 y o  female who presents to the office on 2022 for a health maintenance physical     HPI  The following portions of the patient's history were reviewed and updated as appropriate: allergies, current medications, past family history, past medical history, past social history, past surgical history and problem list     Pt reports overall health:  good  Last Physical: unknown  Last GYN Exam: October 2021    Healthy Diet: well balanced  Routine Exercise:  Five days a week  Weight Concerns:  Deniess    Problems with vision:  Denies  Last Eye Exam:  One year ago     Problems with Hearing:  Denies     Routine Dental Exams:  Every 6 month    Smoking History:  Denies  ETOH Use:  Rarely  Illegal Drug Use:  Denies  Caffeine Use:  Denies    Reproductive Health:    Last PAP:  2021  History of abnormal PAP: denies  LMP:  She is currently breast feeding  Sexually Active:  Denies  Contraceptive:  IUD  Problems with menstrual cycle: denies  Vaginal Discharge:  Denies    Self Breast Exams: monthly  Denies family history of breast CA     Depression Screening:  PHQ-2/9 Depression Screening    Little interest or pleasure in doing things: 0 - not at all  Feeling down, depressed, or hopeless: 0 - not at all  PHQ-2 Score: 0  PHQ-2 Interpretation: Negative depression screen             Family History of Colon CA: denies       Last Labs:  2021    Review of Systems   Constitutional: Negative for activity change, appetite change, chills, diaphoresis and fever  HENT: Negative for congestion, ear discharge, ear pain, postnasal drip, rhinorrhea, sinus pressure, sinus pain and sore throat  Eyes: Negative for pain, discharge, itching and visual disturbance  Respiratory: Negative for cough, chest tightness, shortness of breath and wheezing  Cardiovascular: Negative for chest pain, palpitations and leg swelling  Gastrointestinal: Negative for abdominal pain, constipation, diarrhea, nausea and vomiting  Endocrine: Negative for polydipsia, polyphagia and polyuria  Genitourinary: Negative for difficulty urinating, dysuria and urgency  Musculoskeletal: Negative for arthralgias, back pain and neck pain  Skin: Negative for rash and wound  Neurological: Negative for dizziness, weakness, numbness and headaches           Patient Active Problem List   Diagnosis    Encounter for well adult exam without abnormal findings    BMI 30 0-30 9,adult    Seasonal allergies     (spontaneous vaginal delivery)       Past Medical History:   Diagnosis Date    Acne     Allergic     seasonal       Past Surgical History:   Procedure Laterality Date    WISDOM TOOTH EXTRACTION      WISDOM TOOTH EXTRACTION           Current Outpatient Medications:     Prenatal MV-Min-Fe Fum-FA-DHA (PRENATAL+DHA PO), Take 2 tablets by mouth daily, Disp: , Rfl:     No Known Allergies    Social History     Socioeconomic History    Marital status: Single     Spouse name: None    Number of children: None    Years of education: None    Highest education level: None   Occupational History    None   Tobacco Use    Smoking status: Never Smoker    Smokeless tobacco: Never Used   Vaping Use    Vaping Use: Never used   Substance and Sexual Activity    Alcohol use: Yes     Comment: occasional    Drug use: No    Sexual activity: Yes     Partners: Male     Birth control/protection: I U D     Other Topics Concern    None   Social History Narrative    None     Social Determinants of Health     Financial Resource Strain: Not on file   Food Insecurity: Not on file   Transportation Needs: Not on file   Physical Activity: Not on file   Stress: Not on file   Social Connections: Not on file   Intimate Partner Violence: Not on file   Housing Stability: Not on file       Family History   Problem Relation Age of Onset    Anxiety disorder Mother     Hypertension Mother     Depression Mother     Hypertension Father     Gout Father     Ovarian cancer Maternal Grandmother          at 48    Cancer Maternal Grandmother     Ovarian cancer Maternal Aunt 48    Esophageal cancer Maternal Grandfather     Stroke Paternal Grandmother        Immunization History   Administered Date(s) Administered    DTP 1990, 1992, 1992    HiB 1990, 1990, 1992    INFLUENZA 01/07/2017    OPV 1990, 1990    Tdap 06/23/2021    Tuberculin Skin Test-PPD Intradermal 05/17/2016        Health Maintenance   Topic Date Due    Hepatitis C Screening  Never done    COVID-19 Vaccine (1) Never done    BMI: Followup Plan  10/23/2020    Influenza Vaccine (Season Ended) 09/01/2022    Depression Screening  05/16/2023    BMI: Adult  05/16/2023    Annual Physical  05/16/2023    Cervical Cancer Screening  01/18/2026    DTaP,Tdap,and Td Vaccines (5 - Td or Tdap) 06/23/2031    HIV Screening  Completed    Pneumococcal Vaccine: Pediatrics (0 to 5 Years) and At-Risk Patients (6 to 59 Years)  Aged Out    HIB Vaccine  Aged Out    Hepatitis B Vaccine  Aged Out    IPV Vaccine  Aged Out    Hepatitis A Vaccine  Aged Out    Meningococcal ACWY Vaccine  Aged Out    HPV Vaccine  Aged Out         Objective:  Vitals:    05/16/22 0701   BP: 100/64   BP Location: Left arm   Patient Position: Sitting   Cuff Size: Standard   Pulse: 65   Temp: (!) 97 1 °F (36 2 °C)   TempSrc: Tympanic   SpO2: 99%   Weight: 76 kg (167 lb 9 6 oz)   Height: 5' 5 16" (1 655 m)     Wt Readings from Last 3 Encounters:   05/16/22 76 kg (167 lb 9 6 oz)   11/02/21 83 6 kg (184 lb 6 4 oz)   09/28/21 85 4 kg (188 lb 3 2 oz)     Body mass index is 27 76 kg/m²  No exam data present       Physical Exam  Constitutional:       General: She is not in acute distress  Appearance: She is well-developed  She is not diaphoretic  HENT:      Head: Normocephalic and atraumatic  Right Ear: External ear normal       Left Ear: External ear normal       Nose: Nose normal       Mouth/Throat:      Pharynx: No oropharyngeal exudate  Eyes:      General:         Right eye: No discharge  Left eye: No discharge  Conjunctiva/sclera: Conjunctivae normal       Pupils: Pupils are equal, round, and reactive to light  Neck:      Thyroid: No thyromegaly     Cardiovascular:      Rate and Rhythm: Normal rate and regular rhythm  Heart sounds: Normal heart sounds  No murmur heard  No friction rub  No gallop  Pulmonary:      Effort: Pulmonary effort is normal  No respiratory distress  Breath sounds: Normal breath sounds  No stridor  No wheezing or rales  Abdominal:      General: Bowel sounds are normal  There is no distension  Palpations: Abdomen is soft  Tenderness: There is no abdominal tenderness  Musculoskeletal:      Cervical back: Normal range of motion and neck supple  Lymphadenopathy:      Cervical: No cervical adenopathy  Skin:     General: Skin is warm and dry  Findings: No erythema or rash  Neurological:      Mental Status: She is alert and oriented to person, place, and time  Psychiatric:         Behavior: Behavior normal          Thought Content: Thought content normal          Judgment: Judgment normal              No future appointments      Matthew Baeza, [address] [facility]    Patient Care Team:  Moreno Rubin MD as PCP - General (Internal Medicine)  More[facility] P[facility]  CHITO Funes as Nurse Practitioner (Obstetrics and Gynecology)  Cecilia Gonzalez MD (Maternal and Fetal Medicine)  Juliet Duff MD (Maternal and Fetal Medicine)  Bakari Reyna MD (Maternal and Fetal Medicine)  Lindy Diop MD (Maternal and Fetal Medicine)  Maria Luz Saucedo MD (Maternal and Fetal Medicine)  Cricket Mccormack MD (Maternal and Fetal Medicine)

## 2022-05-16 NOTE — ASSESSMENT & PLAN NOTE
Advised patient to continue with well-balanced diet and daily exercise  Will get updated fasting blood work    Up-to-date with vaccinations, up-to-date with cervical cancer screening, encouraged monthly self-breast examination, follow-up in 1 year or sooner as needed for annual physical

## 2023-02-15 ENCOUNTER — HOSPITAL ENCOUNTER (EMERGENCY)
Facility: HOSPITAL | Age: 33
Discharge: HOME | End: 2023-02-15
Attending: EMERGENCY MEDICINE

## 2023-02-15 ENCOUNTER — OFFICE VISIT (OUTPATIENT)
Dept: URGENT CARE | Age: 33
End: 2023-02-15

## 2023-02-15 VITALS
OXYGEN SATURATION: 100 % | HEART RATE: 74 BPM | TEMPERATURE: 98.5 F | RESPIRATION RATE: 18 BRPM | DIASTOLIC BLOOD PRESSURE: 82 MMHG | SYSTOLIC BLOOD PRESSURE: 122 MMHG

## 2023-02-15 VITALS
HEART RATE: 89 BPM | OXYGEN SATURATION: 100 % | TEMPERATURE: 99.5 F | SYSTOLIC BLOOD PRESSURE: 142 MMHG | DIASTOLIC BLOOD PRESSURE: 87 MMHG | RESPIRATION RATE: 18 BRPM

## 2023-02-15 DIAGNOSIS — R11.0 NAUSEA: ICD-10-CM

## 2023-02-15 DIAGNOSIS — K59.00 CONSTIPATION: ICD-10-CM

## 2023-02-15 DIAGNOSIS — K59.00 CONSTIPATION, UNSPECIFIED CONSTIPATION TYPE: ICD-10-CM

## 2023-02-15 DIAGNOSIS — R10.9 ABDOMINAL PAIN IN FEMALE: Primary | ICD-10-CM

## 2023-02-15 DIAGNOSIS — R10.9 ABDOMINAL PAIN: Primary | ICD-10-CM

## 2023-02-15 RX ADMIN — POLYETHYLENE GLYCOL 3350, SODIUM SULFATE ANHYDROUS, SODIUM BICARBONATE, SODIUM CHLORIDE, POTASSIUM CHLORIDE 2000 ML: 236; 22.74; 6.74; 5.86; 2.97 POWDER, FOR SOLUTION ORAL at 22:02

## 2023-02-15 NOTE — PROGRESS NOTES
[facility]        NAME: Vaishnavi Urbina is a 34 y o  female  : 1990    MRN: [id number]  DATE: February 15, 2023  TIME: 7:12 PM      Assessment and Plan     Abdominal pain in female [R10 9]  1  Abdominal pain in female  Transfer to other facility      2  Constipation, unspecified constipation type  Transfer to other facility          Patient agreeable to proceed to the ER for further evaluation  Requesting to go to [address] [place] [address]'s about them ER  Patient Instructions     Proceed to the ER for further evaluation  Chief Complaint     Chief Complaint   Patient presents with   • Abdominal Pain     Pt c/o epigastric pain radiating into back  Started 4 nights ago  Hx of constipation and has had similar sx    Only having smaller Bm over the past four da ys  Taking Miralax and colace  Worse after eating  Pt concerned sx not going away  History of Present Illness     Patient is a 31-year-old female who presents with worsening epigastric pain for the past 4 days  States when she is ambulating the pain radiates to her back  States when she is laying down the pain is in the lower abdomen  Reports history of constipation  States she has been taking Colace at home which normally relieves constipation  States she has had small, stringy stools  Reports abdominal distention  Denies blood in her stool  States that she came in tonight due to severe nausea  Denies vomiting  Denies fever  Review of Systems     Review of Systems   Constitutional: Negative for chills and fever  Gastrointestinal: Positive for abdominal distention, abdominal pain, constipation and nausea  Negative for anal bleeding, blood in stool and vomiting  All other systems reviewed and are negative          Current Medications       Current Outpatient Medications:   •  Prenatal MV-Min-Fe Fum-FA-DHA (PRENATAL+DHA PO), Take 2 tablets by mouth daily, Disp: , Rfl:     Current Allergies     Allergies as of 02/15/2023 • (No Known Allergies)              The following portions of the patient's history were reviewed and updated as appropriate: allergies, current medications, past family history, past medical history, past social history, past surgical history and problem list      Past Medical History:   Diagnosis Date   • Acne    • Allergic     seasonal       Past Surgical History:   Procedure Laterality Date   • WISDOM TOOTH EXTRACTION     • WISDOM TOOTH EXTRACTION         Family History   Problem Relation Age of Onset   • Anxiety disorder Mother    • Hypertension Mother    • Depression Mother    • Hypertension Father    • Gout Father    • Ovarian cancer Maternal Grandmother          at 48   • Cancer Maternal Grandmother    • Ovarian cancer Maternal Aunt 48   • Esophageal cancer Maternal Grandfather    • Stroke Paternal Grandmother          Medications have been verified  Objective     /82   Pulse 74   Temp 98 5 °F (36 9 °C) (Tympanic)   Resp 18   SpO2 100%   No LMP recorded  Physical Exam     Physical Exam  Vitals and nursing note reviewed  Constitutional:       General: She is awake  She is not in acute distress  Appearance: Normal appearance  She is not ill-appearing, toxic-appearing or diaphoretic  Cardiovascular:      Rate and Rhythm: Normal rate  Pulses: Normal pulses  Heart sounds: Normal heart sounds, S1 normal and S2 normal    Pulmonary:      Effort: Pulmonary effort is normal       Breath sounds: Normal breath sounds and air entry  Abdominal:      General: Abdomen is flat  Bowel sounds are normal  There is distension (mild)  Palpations: Abdomen is soft  Tenderness: There is abdominal tenderness (generalized lower abdomen)  There is no right CVA tenderness or left CVA tenderness  Skin:     General: Skin is warm  Capillary Refill: Capillary refill takes less than 2 seconds  Neurological:      Mental Status: She is alert     Psychiatric:         Mood and Affect: Mood normal          Behavior: Behavior normal          Thought Content:  Thought content normal          Judgment: Judgment normal

## 2023-02-16 NOTE — DISCHARGE INSTRUCTIONS
You were seen in the ED for constipation  Return to the ED for any worsening symptoms or new symptoms  Follow up with your primary care doctor as soon as possible  Take your Golytely at home

## 2023-02-16 NOTE — ED ATTENDING ATTESTATION
2/15/2023  Garret Gutierrez DO, saw and evaluated the patient  I have discussed the patient with the resident/non-physician practitioner and agree with the resident's/non-physician practitioner's findings, Plan of Care, and MDM as documented in the resident's/non-physician practitioner's note, except where noted  All available labs and Radiology studies were reviewed  I was present for key portions of any procedure(s) performed by the resident/non-physician practitioner and I was immediately available to provide assistance  At this point I agree with the current assessment done in the Emergency Department  I have conducted an independent evaluation of this patient a history and physical is as follows:    Patient was sent from urgent care for evaluation of constipation causing abdominal pain similar to multiple prior episodes  She usually takes laxatives with relief but did not get any this time  She is able to eat but does get mild nausea and increased discomfort  She is still having small amounts of stool that is not all liquid  She has never seen GI or used a bowel regimen for daily regularity  No hematochezia, melena or hematemesis  No change in symptoms w/voiding  No recent travel or similar sick contacts  Denies f/c, CP, SOB, v/d  12 system ROS o/w negative  PE: NAD, appears comfortable, alert; PERRL, EOMI; MMM, no posterior oropharyngeal exudate, edema or erythema; HRR, no murmur; lungs CTA w/o w/r/r, POx 100% on RA (nl); abdomen s/nd/nt, no r/g/r, (-) Rovsing's, nl BS in all 4 quadrant, no CVA TTP; (-) LE edema or calf TTP, FROM extremities x4; skin p/w/d; CNs GI/NF, oriented  MDM: Abdominal discomfort - constipation, IBS, clinically unlikely IBD, acute pancreatitis, acute appendicitis, acute diverticulitis, bowel obstruction or ischemia  A/P: Will treat symptoms, recommend daily GI regimen such as Metamucil, will refer to GI for follow-up      ED Course         Critical Care Time  Procedures

## 2023-02-16 NOTE — ED PROVIDER NOTES
History  Chief Complaint   Patient presents with   • Abdominal Pain     ABD and nausea since Saturday  Has been unable to pass bowel movement during that time  Seen at walk-in in advised to come here for potential constipation  35 y/o female patient presents to ED c/o abd pain and nausea onset 4 days ago  Patient states that she has had this abdominal pain before when she is constipated  States usually last couple days and she takes MiraLAX  Patient states that she has been taking MiraLAX and Colace without improvement of her symptoms  Patient states that she is making minimal bowel movement  Patient was seen at urgent care and was told to come to the ED because they are concern for fecal impaction  Denies any fever, chest pain, shortness of breath, diarrhea, vomiting but admits to some nausea  States pain worsens when she eats  Denies any rectal pain  Prior to Admission Medications   Prescriptions Last Dose Informant Patient Reported? Taking? Prenatal MV-Min-Fe Fum-FA-DHA (PRENATAL+DHA PO)   Yes No   Sig: Take 2 tablets by mouth daily      Facility-Administered Medications: None       Past Medical History:   Diagnosis Date   • Acne    • Allergic     seasonal       Past Surgical History:   Procedure Laterality Date   • WISDOM TOOTH EXTRACTION     • WISDOM TOOTH EXTRACTION         Family History   Problem Relation Age of Onset   • Anxiety disorder Mother    • Hypertension Mother    • Depression Mother    • Hypertension Father    • Gout Father    • Ovarian cancer Maternal Grandmother          at 48   • Cancer Maternal Grandmother    • Ovarian cancer Maternal Aunt 48   • Esophageal cancer Maternal Grandfather    • Stroke Paternal Grandmother      I have reviewed and agree with the history as documented      E-Cigarette/Vaping   • E-Cigarette Use Never User      E-Cigarette/Vaping Substances   • Nicotine No    • THC No    • CBD No    • Flavoring No    • Other No    • Unknown No      Social History     Tobacco Use   • Smoking status: Never   • Smokeless tobacco: Never   Vaping Use   • Vaping Use: Never used   Substance Use Topics   • Alcohol use: Yes     Comment: occasional   • Drug use: No        Review of Systems   Gastrointestinal: Positive for abdominal pain, constipation and nausea  All other systems reviewed and are negative  Physical Exam  ED Triage Vitals [02/15/23 1949]   Temperature Pulse Respirations Blood Pressure SpO2   99 5 °F (37 5 °C) 89 18 142/87 100 %      Temp Source Heart Rate Source Patient Position - Orthostatic VS BP Location FiO2 (%)   Oral Monitor Sitting Left arm --      Pain Score       3             Orthostatic Vital Signs  Vitals:    02/15/23 1949   BP: 142/87   Pulse: 89   Patient Position - Orthostatic VS: Sitting       Physical Exam  Vitals reviewed  Constitutional:       Appearance: Normal appearance  HENT:      Head: Normocephalic and atraumatic  Nose: Nose normal       Mouth/Throat:      Mouth: Mucous membranes are moist       Pharynx: Oropharynx is clear  Eyes:      Extraocular Movements: Extraocular movements intact  Conjunctiva/sclera: Conjunctivae normal    Cardiovascular:      Rate and Rhythm: Normal rate and regular rhythm  Pulses: Normal pulses  Heart sounds: Normal heart sounds  Pulmonary:      Effort: Pulmonary effort is normal       Breath sounds: Normal breath sounds  Abdominal:      General: Bowel sounds are normal       Palpations: Abdomen is soft  Tenderness: There is abdominal tenderness (mild discomfort to palpation) in the left upper quadrant  Comments: Able to palpate stool caliber and right lower quadrant  Musculoskeletal:         General: Normal range of motion  Cervical back: Normal range of motion  Skin:     General: Skin is warm and dry  Neurological:      General: No focal deficit present  Mental Status: She is alert and oriented to person, place, and time   Mental status is at baseline  ED Medications  Medications   polyethylene glycol (GOLYTELY) bowel prep 2,000 mL (2,000 mL Oral Given 2/15/23 2202)       Diagnostic Studies  Results Reviewed     None                 No orders to display         Procedures  Procedures      ED Course                             SBIRT 20yo+    Flowsheet Row Most Recent Value   SBIRT (23 yo +)    In order to provide better care to our patients, we are screening all of our patients for alcohol and drug use  Would it be okay to ask you these screening questions? Unable to answer at this time Filed at: 02/15/2023 2589                Medical Decision Making  57-year-old female patient presenting with constipation  Patient states that she has had minimal bowel movement since 4 days ago  Patient has some generalized abdominal discomfort and some nausea  Exam shows left upper quadrant discomfort and right lower quadrant discomfort, palpate stool caliber and right lower quadrant  States this feels similar to when she had constipation in the past   Patient given GoLytely  Stable for discharge with follow-up with PCP  Return precautions given  Abdominal pain: acute illness or injury     Details: Likely secondary to constipation  Constipation: acute illness or injury     Details: Able to palpate stool caliber and right lower quadrant  Symptoms typical of her constipation in the past   Nausea: acute illness or injury  Amount and/or Complexity of Data Reviewed  Discussion of management or test interpretation with external provider(s): Follow-up with PCP, instructed to take GoLytely at home  Risk  Prescription drug management              Disposition  Final diagnoses:   Abdominal pain   Nausea   Constipation     Time reflects when diagnosis was documented in both MDM as applicable and the Disposition within this note     Time User Action Codes Description Comment    2/15/2023  9:50  [facility] [R10 9] Abdominal pain     2/15/2023  9:50 PM Alfred Huitron [R11 0] Nausea     2/15/2023  9:50 PM Zachary Molina [K59 00] Constipation       ED Disposition     ED Disposition   Discharge    Condition   Stable    Date/Time   Wed Feb 15, 2023  9:51 PM    Comment   [address] discharge to home/self care  Follow-up Information     Follow up With Specialties Details Why Contact Maya Burkett MD Internal Medicine Go to  as needed [address] [place] [address]  [phone]            Discharge Medication List as of 2/15/2023 10:27 PM      CONTINUE these medications which have NOT CHANGED    Details   Prenatal MV-Min-Fe Fum-FA-DHA (PRENATAL+DHA PO) Take 2 tablets by mouth daily, Historical Med               PDMP Review     None           ED Provider  Attending physically available and evaluated [address]  I managed the patient along with the ED Attending      Electronically Signed by         Herb Tovar MD  02/17/23 2154

## 2023-04-04 ENCOUNTER — ANNUAL EXAM (OUTPATIENT)
Dept: OBGYN CLINIC | Facility: CLINIC | Age: 33
End: 2023-04-04

## 2023-04-04 VITALS
HEIGHT: 65 IN | BODY MASS INDEX: 27.96 KG/M2 | DIASTOLIC BLOOD PRESSURE: 70 MMHG | SYSTOLIC BLOOD PRESSURE: 110 MMHG | WEIGHT: 167.8 LBS

## 2023-04-04 DIAGNOSIS — Z30.432 ENCOUNTER FOR IUD REMOVAL: ICD-10-CM

## 2023-04-04 DIAGNOSIS — Z01.419 WOMEN'S ANNUAL ROUTINE GYNECOLOGICAL EXAMINATION: Primary | ICD-10-CM

## 2023-04-04 RX ORDER — DIPHENOXYLATE HYDROCHLORIDE AND ATROPINE SULFATE 2.5; .025 MG/1; MG/1
1 TABLET ORAL DAILY
COMMUNITY

## 2023-04-04 NOTE — PATIENT INSTRUCTIONS
The patient was informed of a stable GYN examination  A Pap smear was performed  The IUD was removed with difficulty  She is aware she is now can get pregnant anytime  This is what she wants    She should return to my office when she has a positive pregnancy test

## 2023-04-04 NOTE — PROGRESS NOTES
"Assessment/Plan:    The patient was informed of a stable GYN examination  A Pap smear was performed  The IUD was removed without incident or complications  She is now and is aware she is could be pregnant at any time  This is what she wishes for  She is content with her weight  She sees a dentist on a regular basis  Denies any prior depression or anxiety  She denies any major  or GI complaints  She will inform us when she is pregnant  Subjective:      Patient ID: Sarabjit Alan is a 34 y o  female  HPI  This is a 72-year-old white female, she is a  1 para 1 with 1 vaginal delivery almost 20 months ago  She has an IUD for contraception  She now wants removal of the IUD today  She denies any major gynecological  or GI complaint  There is no problem with intimacy  Her breasts are back to normal   She feels safe at home  She sees a dentist on a regular basis  Denies any prior depression or anxiety  She is content with her weight  There are no new major family illnesses to report  She works as a physical therapist at [employer]  Her mother was recently diagnosed with Guillain-Barré syndrome      The following portions of the patient's history were reviewed and updated as appropriate: allergies, current medications, past family history, past medical history, past social history, past surgical history and problem list     Review of Systems   All other systems reviewed and are negative  Objective:      /70   Ht 5' 5\" (1 651 m)   Wt 76 1 kg (167 lb 12 8 oz)   LMP 2023 (Approximate)   BMI 27 92 kg/m²          Physical Exam  Vitals reviewed  Exam conducted with a chaperone present  Constitutional:       Appearance: Normal appearance  She is normal weight  HENT:      Head: Normocephalic and atraumatic  Mouth/Throat:      Mouth: Mucous membranes are moist    Eyes:      Extraocular Movements: Extraocular movements intact        Conjunctiva/sclera: " Conjunctivae normal       Pupils: Pupils are equal, round, and reactive to light  Cardiovascular:      Rate and Rhythm: Normal rate and regular rhythm  Pulses: Normal pulses  Pulmonary:      Effort: Pulmonary effort is normal    Chest:   Breasts:     Breasts are symmetrical       Right: Normal  No swelling, bleeding, inverted nipple, mass, nipple discharge, skin change or tenderness  Left: No swelling, bleeding, inverted nipple, mass, nipple discharge, skin change or tenderness  Abdominal:      General: Abdomen is flat  Bowel sounds are normal  There is no distension  Palpations: Abdomen is soft  There is no mass  Tenderness: There is no abdominal tenderness  There is no guarding or rebound  Hernia: No hernia is present  There is no hernia in the umbilical area, ventral area, left inguinal area or right inguinal area  Genitourinary:     General: Normal vulva  Pubic Area: No rash or pubic lice  Labia:         Right: No rash, tenderness, lesion or injury  Left: No rash, tenderness, lesion or injury  Urethra: No prolapse, urethral pain, urethral swelling or urethral lesion  Vagina: Normal  No signs of injury and foreign body  No vaginal discharge, erythema, tenderness, bleeding, lesions or prolapsed vaginal walls  Cervix: Normal       Uterus: Normal  Not deviated, not enlarged, not fixed, not tender and no uterine prolapse  Adnexa: Right adnexa normal and left adnexa normal         Right: No mass, tenderness or fullness  Left: No mass, tenderness or fullness  Rectum: Normal       Comments: The external genitalia normal limits the vagina is clean the cervix is parous  IUD string was seen  The uterus is anterior normal size adnexa is clear  There is no evidence of prolapse  A Pap smear was performed  Musculoskeletal:         General: Normal range of motion  Cervical back: Normal range of motion and neck supple  Lymphadenopathy:      Upper Body:      Right upper body: No supraclavicular or axillary adenopathy  Left upper body: No supraclavicular or axillary adenopathy  Lower Body: No right inguinal adenopathy  No left inguinal adenopathy  Skin:     General: Skin is warm  Neurological:      General: No focal deficit present  Mental Status: She is alert and oriented to person, place, and time  Psychiatric:         Mood and Affect: Mood normal          Behavior: Behavior normal                Iud removal    Date/Time: 4/4/2023 4:43 PM  Performed by: Jeanette Tovar MD  Authorized by: Jeanette Tovar MD   Universal Protocol:  Consent: Verbal consent obtained  Timeout called at: 4/4/2023 3:45 PM   Patient understanding: patient states understanding of the procedure being performed  Patient consent: the patient's understanding of the procedure matches consent given  Procedure consent: procedure consent matches procedure scheduled  Relevant documents: relevant documents present and verified  Test results: test results available and properly labeled  Site marked: the operative site was not marked  Radiology Images displayed and confirmed  If images not available, report reviewed: imaging studies not available  Patient identity confirmed: verbally with patient      Procedure:     Removed with no complications: yes      Removal due to mechanical complications of IUD: no      Removal due to infection and inflammatory reaction: no      Other reason for removal:  The patient would like to conceive to have another child  Comments: The IUD was removed shown to the patient prior to disposal   There was no complications or incidents

## 2023-04-07 LAB
HPV HR 12 DNA CVX QL NAA+PROBE: NEGATIVE
HPV16 DNA CVX QL NAA+PROBE: NEGATIVE
HPV18 DNA CVX QL NAA+PROBE: NEGATIVE

## 2023-06-19 ENCOUNTER — CONSULT (OUTPATIENT)
Dept: GASTROENTEROLOGY | Facility: CLINIC | Age: 33
End: 2023-06-19
Payer: COMMERCIAL

## 2023-06-19 VITALS
DIASTOLIC BLOOD PRESSURE: 74 MMHG | TEMPERATURE: 98.1 F | WEIGHT: 170.2 LBS | BODY MASS INDEX: 28.36 KG/M2 | HEIGHT: 65 IN | SYSTOLIC BLOOD PRESSURE: 118 MMHG

## 2023-06-19 DIAGNOSIS — K59.00 CONSTIPATION: ICD-10-CM

## 2023-06-19 PROCEDURE — 99214 OFFICE O/P EST MOD 30 MIN: CPT | Performed by: PHYSICIAN ASSISTANT

## 2023-06-19 NOTE — PROGRESS NOTES
"[facility] - Outpatient Consultation  Jitendra Walters 34 y o  female MRN: [id number]  Encounter: [id number]          ASSESSMENT AND PLAN:      Idalia Bermeo is a 33 y/o female with anxiety who presents for consultation of constipation  1  Chronic Constipation  Pt has had longstanding issues with constipation for many yrs that worsened briefly in February while she was under a lot of stress  She says much of the stress has alleviated since then so she feels she is back to her baseline and not having much issues with constipation at this time  -explained gut-brain connection and how stressful situations can certainly change someone's bowel habits, rj as she has underlying constipation to begin with  -explained that as she has no acute changes in bowel habits + no alarm symptoms, I agree with holding off on further work-up at this time but would like to at least check her thyroid due to chronicity of her constipation  -TSH ordered  -continue drinking 64 ounces/day  -continue with miralax PRN constipation     ______________________________________________________________________    HPI:  Idalia Bermeo is a 33 y/o female with anxiety who presents for consultation of constipation  Pt says she has always had issues with constipation since she was very young, in which she would be \"normal\" for a few days and then skip 1-2 days/week but miralax or colace always helped  Back in 2/2023, she had a 4-5 day stint where she wasn't moving her bowels at all despite being on miralax and colace  She says she was very stressed at the time, which she suspects triggered this  She says since then, her stress has gotten much better so she is \"back to normal \" she is having normal, formed BMs almost daily and is not having any abdominal pain, diarrhea, straining, n/v  She does drink at least 64 ounces of water/day   She says she does not want any further work-up done unless necessary as she is back to her " baseline, but wanted to form relationship with GI in case  Pt denies family hx of colon cancer, unintentional weight loss, fevers, chills, night sweats, bloody or black BMs  Pt denies prior abdominal surgical hx  She denies any new meds or supplements prior to this  REVIEW OF SYSTEMS:    CONSTITUTIONAL: Denies any fever, chills, rigors, and weight loss  HEENT: No earache or tinnitus  Denies hearing loss or visual disturbances  CARDIOVASCULAR: No chest pain or palpitations  RESPIRATORY: Denies any cough, hemoptysis, shortness of breath or dyspnea on exertion  GASTROINTESTINAL: As noted in the History of Present Illness  GENITOURINARY: No problems with urination  Denies any hematuria or dysuria  NEUROLOGIC: No dizziness or vertigo, denies headaches  MUSCULOSKELETAL: Denies any muscle or joint pain  SKIN: Denies skin rashes or itching  ENDOCRINE: Denies excessive thirst  Denies intolerance to heat or cold  PSYCHOSOCIAL: Denies depression or anxiety  Denies any recent memory loss         Historical Information   Past Medical History:   Diagnosis Date   • Acne    • Allergic     seasonal     Past Surgical History:   Procedure Laterality Date   • WISDOM TOOTH EXTRACTION     • WISDOM TOOTH EXTRACTION       Social History   Social History     Substance and Sexual Activity   Alcohol Use Yes    Comment: occasional     Social History     Substance and Sexual Activity   Drug Use No     Social History     Tobacco Use   Smoking Status Never   Smokeless Tobacco Never     Family History   Problem Relation Age of Onset   • Anxiety disorder Mother    • Hypertension Mother    • Depression Mother    • Hypertension Father    • Gout Father    • Ovarian cancer Maternal Grandmother          at 48   • Cancer Maternal Grandmother    • Ovarian cancer Maternal Aunt 48   • Esophageal cancer Maternal Grandfather    • Stroke Paternal Grandmother        Meds/Allergies       Current Outpatient Medications:   •  multivitamin "(THERAGRAN) TABS  •  Prenatal MV-Min-Fe Fum-FA-DHA (PRENATAL+DHA PO)    No Known Allergies        Objective     Blood pressure 118/74, temperature 98 1 °F (36 7 °C), temperature source Tympanic, height 5' 5\" (1 651 m), weight 77 2 kg (170 lb 3 2 oz), currently breastfeeding  Body mass index is 28 32 kg/m²  PHYSICAL EXAM:      General Appearance:   Alert, cooperative, no distress   HEENT:   Normocephalic, atraumatic, anicteric      Neck:  Supple, symmetrical, trachea midline   Lungs:   Clear to auscultation bilaterally; no rales, rhonchi or wheezing; respirations unlabored    Heart[de-identified]   Regular rate and rhythm; no murmur, rub, or gallop  Abdomen:   Soft, non-tender, non-distended; normal bowel sounds; no masses, no organomegaly    Genitalia:   Deferred    Rectal:   Deferred    Extremities:  No cyanosis, clubbing or edema    Pulses:  2+ and symmetric    Skin:  No jaundice, rashes, or lesions    Lymph nodes:  No palpable cervical lymphadenopathy        Lab Results:   No visits with results within 1 Day(s) from this visit  Latest known visit with results is:   Annual Exam on 04/04/2023   Component Date Value   • Case Report 04/04/2023                      Value:Gynecologic Cytology Report                       Case: [id number]                                  Authorizing Provider:  Troy Riddle MD         Collected:           04/04/2023 1549              Ordering Location:     [facility]      Received:            04/04/2023 1543              First Screen:          CODIE Pulido                                                       Specimen:    LIQUID-BASED PAP, SCREENING, Cervix                                                       • Primary Interpretation 04/04/2023 Negative for intraepithelial lesion or malignancy    • Specimen Adequacy 04/04/2023 Satisfactory for evaluation  Endocervical/transformation zone component present      • Additional Information 04/04/2023                      " Value:This result contains rich text formatting which cannot be displayed here  • LMP 04/04/2023 3/27/2023    • HPV Other HR 04/04/2023 Negative    • HPV16 04/04/2023 Negative    • HPV18 04/04/2023 Negative          Radiology Results:   No results found

## 2023-06-28 ENCOUNTER — TELEPHONE (OUTPATIENT)
Dept: OBGYN CLINIC | Facility: CLINIC | Age: 33
End: 2023-06-28

## 2023-06-28 NOTE — TELEPHONE ENCOUNTER
Pt called and said she got a positive pregnancy test   LMP 5/23  She would like a call back to schedule an appt

## 2023-06-29 NOTE — TELEPHONE ENCOUNTER
Pt scheduled for 8/1 in [place]  Patient was looking for a place closer to [place]  Told her for her first 1 or 2 appointments she would need to be seen in [place] in case we have to do an ultrasound  Patient understand and scheduled

## 2023-07-31 NOTE — PROGRESS NOTES
Pt presents for initial OB appointment. Pap and PE done . Cx's to be run today. MRSA: denies   Varicella: had vaccine   STD history: Denies   Drug/alcohol use history: denies   Slip written for initial OB panel plus varicella    US done in office today: US done. Initially unable to visualize uterus well. I had pt empty her bladder and scan was much easier. CRL measures c/w 10w0d + FHR noted @172 bpm. GS c/w 11w0d. Small amount of fluid noted in the cervical canal with a small nabothian cyst noted as well. PMHX:denies     POBHX: 1      PSURGHX: wisdom teeth       All questions answered. Ultrasound Probe Disinfection    A transvaginal ultrasound was performed.    Probe identification: probe serial number [facility]: [id number]  Disinfection process: Disinfection was performed with High Level Disinfection Process (Trophon)    Dk Hooper PA-C

## 2023-08-01 ENCOUNTER — INITIAL PRENATAL (OUTPATIENT)
Dept: OBGYN CLINIC | Facility: CLINIC | Age: 33
End: 2023-08-01
Payer: COMMERCIAL

## 2023-08-01 ENCOUNTER — PATIENT MESSAGE (OUTPATIENT)
Dept: OBGYN CLINIC | Facility: CLINIC | Age: 33
End: 2023-08-01

## 2023-08-01 VITALS
BODY MASS INDEX: 29.12 KG/M2 | SYSTOLIC BLOOD PRESSURE: 122 MMHG | WEIGHT: 174.8 LBS | DIASTOLIC BLOOD PRESSURE: 66 MMHG | HEIGHT: 65 IN

## 2023-08-01 DIAGNOSIS — Z34.91 FIRST TRIMESTER PREGNANCY: Primary | ICD-10-CM

## 2023-08-01 PROCEDURE — 87491 CHLMYD TRACH DNA AMP PROBE: CPT | Performed by: PHYSICIAN ASSISTANT

## 2023-08-01 PROCEDURE — 87591 N.GONORRHOEAE DNA AMP PROB: CPT | Performed by: PHYSICIAN ASSISTANT

## 2023-08-01 PROCEDURE — OBC: Performed by: PHYSICIAN ASSISTANT

## 2023-08-02 LAB
C TRACH DNA SPEC QL NAA+PROBE: NEGATIVE
N GONORRHOEA DNA SPEC QL NAA+PROBE: NEGATIVE

## 2023-08-03 ENCOUNTER — TELEPHONE (OUTPATIENT)
Facility: HOSPITAL | Age: 33
End: 2023-08-03

## 2023-08-03 NOTE — TELEPHONE ENCOUNTER
Called patient to schedule MFM appointment, based on referral issued to Maternal Fetal Medicine by [facility] office. Left voicemail requesting patient to call back and schedule appointment, with office number for return call [phone].

## 2023-08-17 NOTE — PATIENT COMMUNICATION
Placed call to Prior Auth team at [phone], spoke to [facility]. He will review the prio auth for her insurance and then the central scheduling team will reach out to her.

## 2023-08-22 ENCOUNTER — ROUTINE PRENATAL (OUTPATIENT)
Dept: PERINATAL CARE | Facility: CLINIC | Age: 33
End: 2023-08-22
Payer: OTHER GOVERNMENT

## 2023-08-22 VITALS
HEART RATE: 84 BPM | DIASTOLIC BLOOD PRESSURE: 70 MMHG | BODY MASS INDEX: 29.69 KG/M2 | WEIGHT: 178.2 LBS | SYSTOLIC BLOOD PRESSURE: 142 MMHG | HEIGHT: 65 IN

## 2023-08-22 DIAGNOSIS — Z3A.13 13 WEEKS GESTATION OF PREGNANCY: ICD-10-CM

## 2023-08-22 DIAGNOSIS — Z36.82 NUCHAL TRANSLUCENCY OF FETUS ON PRENATAL ULTRASOUND: Primary | ICD-10-CM

## 2023-08-22 DIAGNOSIS — Z13.79 GENETIC SCREENING: ICD-10-CM

## 2023-08-22 PROCEDURE — 76813 OB US NUCHAL MEAS 1 GEST: CPT | Performed by: OBSTETRICS & GYNECOLOGY

## 2023-08-22 NOTE — LETTER
August 26, 2023       No Recipients    Patient: José Miguel Mckeon   YOB: 1990   Date of Visit: 8/22/2023       Dear Ms. Bhagat: Thank you for referring Brenda Rodney to me for evaluation. Below are my notes for this consultation. If you have questions, please do not hesitate to call me. I look forward to following your patient along with you. Sincerely,        Maegan Eddy MD        CC:   No Recipients    Maegan Eddy MD  8/26/2023  5:07 PM  Sign when Signing Visit  A fetal ultrasound was completed. See Ob procedures in Epic for an interpretation and recommendations. Do not hesitate to contact us in [facility] if you have questions. Akiko Beauchamp MD, [address] [facility]  Maternal Fetal Medicine

## 2023-08-22 NOTE — LETTER
August 26, 2023     Michelle Henao PA-C  [address] [facility] [address]    Patient: John Merida   YOB: 1990   Date of Visit: 8/22/2023       Dear Ms Ginger Vidal: Thank you for referring John Villanueva to me for evaluation. Below are my notes for this consultation. If you have questions, please do not hesitate to call me. I look forward to following your patient along with you. Sincerely,        Gildardo Dyson MD        CC: No Recipients    Gildardo Dyson MD  8/26/2023  5:07 PM  Sign when Signing Visit  A fetal ultrasound was completed. See Ob procedures in Epic for an interpretation and recommendations. Do not hesitate to contact us in [facility] if you have questions. Cintia Polk MD, [address] [facility]  Maternal Fetal Medicine

## 2023-08-22 NOTE — PROGRESS NOTES
Patient chose to have Stepwise Part 1 Screening from [facility]. Instructed patient blood work must be collected no later than 8/26/2023    Reviewed [facility] online appointment scheduling availability. Part 1 results will be available in approximately 7-10 business days. Maternal-Fetal Medicine will call patient with results or she can view in her [facility]. Optimal collection for Part 2 is between 16-18 weeks but can be collected up to 22 weeks gestation. Lab slip and instruction letter will be mailed from our office. Patient instructed to take the paper lab slip to lab, this specialty genetic test is not yet in Epic. Patient verbalized understanding of all instructions.

## 2023-08-23 LAB
EXTERNAL HEPATITIS B SURFACE ANTIGEN: NORMAL
EXTERNAL HIV-1/2 AB-AG: NORMAL
EXTERNAL RUBELLA IGG QUANTITATION: NORMAL

## 2023-08-24 LAB — TSH SERPL-ACNC: 0.98 MIU/L

## 2023-08-24 NOTE — PROGRESS NOTES
VISIT: (+) n - comes and goes; (+) HA - occasional/mild; (+) vb and cramping Saturday evening - noticed mild cramping that lasted a couple minutes - later in bathroom she noted small amount of blood on toilet tissue with wiping and a few drops in toilet - now just noticing occasional old blood with wiping; Had called service on Saturday and advised to watch and call back if increased/worsened; Denies any intercourse prior to bleeding but she was straining to have a BM the night before and the morning of the bleeding - encouraged hydration, high fiber diet, stool softeners, can consider PNV every other day. Denies v/lof/edema/dv/smoking; urine neg/neg  Labs - initial ob labs completed and reviewed today; CF/SMA - desires to have done but still no phone call about prior auth; [facility] - first tri ultrasound done - opted sequential screen - aware will need second lab draw btw 16-20 weeks; level 2 scheduled; Her Bps were elevated at [facility] appt - states she was very anxious for that appt and also feels anxious for appt today so running a little higher than what she normally runs  PNVs + DHA - tolerating daily; r/chago 1 mg folic acid and DHA daily  No FM yet    Recent annual 4/4/23 - Pap WNL (-) HRHPV type 16/18 neg so pap deferred today; 8/1/23 - C/G neg; Physical exam minus breast and pelvic exam completed today;  Encouraged hydration.  Continue to watch bleeding - if increases/worsens - needs to call  Re-ordered CF/SMA  RTO in 4 weeks for routine ob check or sooner if needed

## 2023-08-25 LAB
ABO GROUP BLD: NORMAL
APPEARANCE UR: CLEAR
BACTERIA UR QL AUTO: ABNORMAL /HPF
BASOPHILS # BLD AUTO: 53 CELLS/UL (ref 0–200)
BASOPHILS NFR BLD AUTO: 0.6 %
BILIRUB UR QL STRIP: NEGATIVE
BLD GP AB SCN SERPL QL: NORMAL
COLOR UR: YELLOW
EOSINOPHIL # BLD AUTO: 36 CELLS/UL (ref 15–500)
EOSINOPHIL NFR BLD AUTO: 0.4 %
ERYTHROCYTE [DISTWIDTH] IN BLOOD BY AUTOMATED COUNT: 12.8 % (ref 11–15)
ERYTHROCYTE [DISTWIDTH] IN BLOOD BY AUTOMATED COUNT: 12.8 % (ref 11–15)
GLUCOSE UR QL STRIP: NEGATIVE
HBV SURFACE AG SERPL QL IA: NORMAL
HCT VFR BLD AUTO: 34.6 % (ref 35–45)
HCT VFR BLD AUTO: 34.6 % (ref 35–45)
HCV AB SERPL QL IA: NORMAL
HGB A MFR BLD: 97.5 %
HGB A2 MFR BLD: 2.5 % (ref 2.2–3.2)
HGB BLD-MCNC: 12.1 G/DL (ref 11.7–15.5)
HGB BLD-MCNC: 12.1 G/DL (ref 11.7–15.5)
HGB F MFR BLD: <1 %
HGB FRACT BLD-IMP: ABNORMAL
HGB UR QL STRIP: NEGATIVE
HIV 1+2 AB+HIV1 P24 AG SERPL QL IA: NORMAL
HYALINE CASTS #/AREA URNS LPF: ABNORMAL /LPF
KETONES UR QL STRIP: NEGATIVE
LEUKOCYTE ESTERASE UR QL STRIP: ABNORMAL
LYMPHOCYTES # BLD AUTO: 1691 CELLS/UL (ref 850–3900)
LYMPHOCYTES NFR BLD AUTO: 19 %
MCH RBC QN AUTO: 31.2 PG (ref 27–33)
MCH RBC QN AUTO: 31.2 PG (ref 27–33)
MCHC RBC AUTO-ENTMCNC: 35 G/DL (ref 32–36)
MCV RBC AUTO: 89.2 FL (ref 80–100)
MCV RBC AUTO: 89.2 FL (ref 80–100)
MONOCYTES # BLD AUTO: 490 CELLS/UL (ref 200–950)
MONOCYTES NFR BLD AUTO: 5.5 %
NEUTROPHILS # BLD AUTO: 6631 CELLS/UL (ref 1500–7800)
NEUTROPHILS NFR BLD AUTO: 74.5 %
NITRITE UR QL STRIP: NEGATIVE
PH UR STRIP: 7 [PH] (ref 5–8)
PLATELET # BLD AUTO: 297 THOUSAND/UL (ref 140–400)
PMV BLD REES-ECKER: 9.5 FL (ref 7.5–12.5)
PROT UR QL STRIP: NEGATIVE
RBC # BLD AUTO: 3.88 MILLION/UL (ref 3.8–5.1)
RBC # BLD AUTO: 3.88 MILLION/UL (ref 3.8–5.1)
RBC #/AREA URNS HPF: ABNORMAL /HPF
RH BLD: NORMAL
RPR SER QL: NORMAL
RUBV IGG SERPL IA-ACNC: 1.94 INDEX
SP GR UR STRIP: 1 (ref 1–1.03)
SQUAMOUS #/AREA URNS HPF: ABNORMAL /HPF
VZV IGG SER IA-ACNC: 1890 INDEX
WBC # BLD AUTO: 8.9 THOUSAND/UL (ref 3.8–10.8)
WBC #/AREA URNS HPF: ABNORMAL /HPF

## 2023-08-26 ENCOUNTER — NURSE TRIAGE (OUTPATIENT)
Dept: OTHER | Facility: OTHER | Age: 33
End: 2023-08-26

## 2023-08-26 NOTE — PROGRESS NOTES
A fetal ultrasound was completed. See Ob procedures in Epic for an interpretation and recommendations. Do not hesitate to contact us in [facility] if you have questions. Grayson Bansal MD, [address] [facility]  Maternal Fetal Medicine

## 2023-08-27 NOTE — TELEPHONE ENCOUNTER
Regarding: pregnant/ 13 wks/ spotting  ----- Message from Lacy Davis sent at 8/26/2023  8:16 PM EDT -----  Pt called, " I am 13 wks pregnant and I saw spots on blood on toilet paper ns james when I was using the bahroom.  I experienced a little cramping a couple of hours before I used the bathroom."

## 2023-08-27 NOTE — TELEPHONE ENCOUNTER
Reason for Disposition  • SPOTTING (single or brief episode)    Answer Assessment - Initial Assessment Questions  1. ONSET: "When did this bleeding start?"        10 minutes ago     2. DESCRIPTION: "Describe the bleeding that you are having." "How much bleeding is there?"     - SPOTTING: spotting, or pinkish / brownish mucous discharge; does not fill panti-liner or pad     - MILD:  less than 1 pad / hour; less than patient's usual menstrual bleeding    - MODERATE: 1-2 pads / hour; 1 menstrual cup every 6 hours; small-medium blood clots (e.g., pea, grape, small coin)    - SEVERE: soaking 2 or more pads/hour for 2 or more hours; 1 menstrual cup every 2 hours; bleeding not contained by pads or continuous red blood from vagina; large blood clots (e.g., golf ball, large coin)       Spotting-bright red, when wiping and some in the toile     3. ABDOMINAL PAIN SEVERITY: If present, ask: "How bad is it?"  (e.g., Scale 1-10; mild, moderate, or severe)    - MILD (1-3): doesn't interfere with normal activities, abdomen soft and not tender to touch     - MODERATE (4-7): interferes with normal activities or awakens from sleep, tender to touch     - SEVERE (8-10): excruciating pain, doubled over, unable to do any normal activities     Abdominal cramping 2 hours ago and since has subsided     4. PREGNANCY: "Do you know how many weeks or months pregnant you are?" "When was the first day of your last normal menstrual period?"     13 weeks 4 days     5. HEMODYNAMIC STATUS: "Are you weak or feeling lightheaded?" If Yes, ask: "Can you stand and walk normally?"      Denies    6.  OTHER SYMPTOMS: "What other symptoms are you having with the bleeding?" (e.g., passed tissue, vaginal discharge, fever, menstrual-type cramps)     denies    Protocols used: PREGNANCY - VAGINAL BLEEDING LESS THAN 20 WEEKS [facility]

## 2023-08-28 ENCOUNTER — ULTRASOUND (OUTPATIENT)
Dept: OBGYN CLINIC | Facility: CLINIC | Age: 33
End: 2023-08-28

## 2023-08-28 VITALS
BODY MASS INDEX: 29.49 KG/M2 | HEIGHT: 65 IN | WEIGHT: 177 LBS | SYSTOLIC BLOOD PRESSURE: 136 MMHG | DIASTOLIC BLOOD PRESSURE: 82 MMHG

## 2023-08-28 DIAGNOSIS — Z34.81 ENCOUNTER FOR SUPERVISION OF NORMAL PREGNANCY IN MULTIGRAVIDA IN FIRST TRIMESTER: Primary | ICD-10-CM

## 2023-08-28 PROCEDURE — PNV: Performed by: PHYSICIAN ASSISTANT

## 2023-08-29 ENCOUNTER — TELEPHONE (OUTPATIENT)
Dept: PERINATAL CARE | Facility: OTHER | Age: 33
End: 2023-08-29

## 2023-08-29 LAB
# FETUSES US: 1
AGE: NORMAL
B-HCG ADJ MOM SERPL: 1.57
B-HCG SERPL-ACNC: 109.5 IU/ML
COLLECT DATE: NORMAL
CURRENT SMOKER: NO
DONATED EGG PATIENT QL: NO
FET CRL US.MEAS: 71 MM
FET CRL US.MEAS: NORMAL MM
FET NUCHAL FOLD MOM THICKNESS US.MEAS: 0.97
FET NUCHAL FOLD THICKNESS US.MEAS: 1.6 MM
FET NUCHAL FOLD THICKNESS US.MEAS: NORMAL MM
FET TS 21 RISK FROM MAT AGE: NORMAL
GA CLIN EST: 13.3 WK
GA METHOD: NORMAL
HX OF NTD NARR: NO
HX OF TRISOMY 21 NARR: NO
IDDM PATIENT QL: NO
INTEGRATED SCN PATIENT-IMP: NORMAL
IVF PREGNANCY: NO
PAPP-A MOM SERPL: 1.5
PAPP-A SERPL-MCNC: 1594.9 NG/ML
PHYSICIAN NPI: NORMAL
SL AMB NASAL BONE: PRESENT
SL AMB NTQR LOCATION ID#: NORMAL
SL AMB NTQR READING PHYS ID#: NORMAL
SL AMB REFERRING PHYSICIAN NAME: NORMAL
SL AMB REFERRING PHYSICIAN PHONE: NORMAL
SL AMB REPEAT SPECIMEN: NO
SL AMB TWIN B NASAL BONE: NORMAL
SONOGRAPHER NAME: NORMAL
SONOGRAPHER: NORMAL
SONOGRAPHER: NORMAL
TS 18 RISK FETUS: NORMAL
TS 21 RISK FETUS: NORMAL
US DATE: NORMAL
US FETUSES STUDY [IMP]: NORMAL

## 2023-08-30 NOTE — RESULT ENCOUNTER NOTE
I have reviewed the patient's lab results which are normal.  Please contact the patient to inform her of the normal results, unless Ms. Claudia Dixon has already reviewed the results using [employer].       Broderick Heller

## 2023-08-31 ENCOUNTER — TELEPHONE (OUTPATIENT)
Dept: PERINATAL CARE | Facility: CLINIC | Age: 33
End: 2023-08-31

## 2023-08-31 NOTE — LETTER
08/31/23  Anastasiya Rivera  1990    Thank you for completing Part 1 of your [facility] Stepwise Sequential Screen. To obtain a complete test result, please complete blood work for Part 2 Sequential Screen between the weeks of  09/04/2023 to 09/17/2023 for being the optimal time however you have until 10/29/2023 to complete this test.     Based on your insurance coverage, please use one of the following [facility] lab locations. The other option is to go to [email or web address].     Call our office for any questions at [phone]        Sincerely,    Mireya Fine, RNC-OB, MFM

## 2023-08-31 NOTE — TELEPHONE ENCOUNTER
----- Message from Kamla Thompson MD sent at 8/30/2023  1:52 PM EDT -----  I have reviewed the patient's lab results which are normal.  Please contact the patient to inform her of the normal results, unless Ms. Melinda Kilgore has already reviewed the results using [employer].       Kamla Thompson

## 2023-08-31 NOTE — TELEPHONE ENCOUNTER
Spoke with Beverly Goodman provided her with results of her Stepwise Part 1 test. Beverly Goodman instructed on part 2. Patient receptive to all information provided and declines further assistance. TRF and lab letter mailed to patient.

## 2023-09-20 LAB
# FETUSES US: 1
AFP ADJ MOM SERPL: 1.19
AFP SERPL-MCNC: 39.5 NG/ML
B-HCG ADJ MOM SERPL: 1.22
B-HCG SERPL-ACNC: 35.6 IU/ML
COLLECT DATE: NORMAL
CURRENT SMOKER: NO
FET CRL US.MEAS: 71 MM
FET NUCHAL FOLD MOM THICKNESS US.MEAS: 0.97
FET NUCHAL FOLD THICKNESS US.MEAS: 1.6 MM
FET TS 21 RISK FROM MAT AGE: NORMAL
GA CLIN EST: 16.6 WK
HX OF NTD NARR: NORMAL
IDDM PATIENT QL: NORMAL
INHIBIN A ADJ MOM SERPL: 1.05
INHIBIN A SERPL-MCNC: 158 PG/ML
INTEGRATED SCN PATIENT-IMP: NORMAL
IVF PREGNANCY: NO
NEURAL TUBE DEFECT RISK FETUS: NORMAL %
PAPP-A MOM SERPL: 1.5
PAPP-A SERPL-MCNC: 1594.9 NG/ML
PHYSICIAN NPI: NORMAL
SL AMB NASAL BONE: PRESENT
SL AMB REFERRING PHYSICIAN NAME: NORMAL
SL AMB REFERRING PHYSICIAN PHONE: NORMAL
SL AMB REPEAT SPECIMEN: NORMAL
SL AMB SPECIMEN # FROM PART 1: NORMAL
SL AMB TWIN B NASAL BONE: NORMAL
TS 18 RISK FETUS: NORMAL
TS 21 RISK FETUS: NORMAL
U ESTRIOL ADJ MOM SERPL: 1.45
U ESTRIOL SERPL-MCNC: 1.15 NG/ML
US DATE: NORMAL

## 2023-09-21 NOTE — RESULT ENCOUNTER NOTE
I have reviewed the patient's lab results which are normal.  Please contact the patient to inform her of the normal results, unless Ms. Sima Gonzalez has already reviewed the results using [employer]       Roberto Prom

## 2023-09-26 NOTE — PROGRESS NOTES
OB/GYN  PN Visit  Isiah [place]  [id number]  2023  2:16 PM  Skyla WadsworthCHITO Silva    S: 34 y.o.  18w2d here for PN visit. Pregnancy complicated by elevated at [facility]. OB complaints:  Denies c/o n/v/, no edema, no smoking, no DV. No vb/lof  No cramping/ctxns or signs of PTL. She reports occasional headaches, relieved with tylenol. She is wondering if influenza vaccine is appropriate- her mother had guillian-barre syndrome with vaccine administration + virus. O:    Pre- Vitals    Flowsheet Row Most Recent Value   Prenatal Assessment    Fetal Heart Rate 140   Prenatal Vitals    Blood Pressure 132/78   Weight - Scale 84.7 kg (186 lb 12.8 oz)   Urine Albumin/Glucose    Dilation/Effacement/Station    Vaginal Drainage    Edema             Gen: no acute distress, nonlabored breathing. OB exam completed: fundal height, +FHT. Urine: -/-    A/P:  #1. 18w2d GESTATION  PTL precautions reviewed. Will review with physician regarding appropriateness of influenza vaccine with family history of guillian-barre syndrome.      RTC in 4 weeks    CHITO Rob  2023  2:16 PM

## 2023-09-28 ENCOUNTER — ROUTINE PRENATAL (OUTPATIENT)
Dept: OBGYN CLINIC | Facility: CLINIC | Age: 33
End: 2023-09-28

## 2023-09-28 VITALS
BODY MASS INDEX: 31.12 KG/M2 | WEIGHT: 186.8 LBS | HEIGHT: 65 IN | DIASTOLIC BLOOD PRESSURE: 78 MMHG | SYSTOLIC BLOOD PRESSURE: 132 MMHG

## 2023-09-28 DIAGNOSIS — Z34.92 SECOND TRIMESTER PREGNANCY: Primary | ICD-10-CM

## 2023-09-28 PROCEDURE — PNV

## 2023-09-28 RX ORDER — DOCUSATE SODIUM 100 MG/1
100 CAPSULE, LIQUID FILLED ORAL 2 TIMES DAILY
COMMUNITY

## 2023-09-30 LAB
CFTR MUT ANL BLD/T: NEGATIVE
CFTR MUT ANL BLD/T: NORMAL
CFTR MUT TESTED BLD/T: NORMAL
INTERPRETATION: ABNORMAL
REF LAB TEST METHOD: NORMAL
SERVICE CMNT-IMP: NORMAL
SMN1 GENE MUT ANL BLD/T: ABNORMAL
SMN2 GENE MUT ANL BLD/T: ABNORMAL
TECHNICAL RESULTS: POSITIVE

## 2023-10-02 ENCOUNTER — TELEPHONE (OUTPATIENT)
Dept: OBGYN CLINIC | Facility: CLINIC | Age: 33
End: 2023-10-02

## 2023-10-02 DIAGNOSIS — Z13.79 ENCOUNTER FOR GENETIC SCREENING: Primary | ICD-10-CM

## 2023-10-02 NOTE — TELEPHONE ENCOUNTER
Patient called, left message on answering machine, requesting a return call regarding her SMA abnormal carrier screening. Lab is not yet reviewed by provider.
Please let patient know she is a carrier for SMA (means she doesn't have SMA but can pass this gene to baby); Baby would have to inherit the gene from both mom and dad to have the condition;  So next step is screening dad for SMA and scheduling with genetic counselor at [facility] to review their risk in greater detail thanks
See result notes for further communication.
admission

## 2023-10-09 ENCOUNTER — PATIENT MESSAGE (OUTPATIENT)
Dept: OBGYN CLINIC | Facility: CLINIC | Age: 33
End: 2023-10-09

## 2023-10-13 ENCOUNTER — TELEPHONE (OUTPATIENT)
Dept: OBGYN CLINIC | Facility: CLINIC | Age: 33
End: 2023-10-13

## 2023-10-13 NOTE — TELEPHONE ENCOUNTER
Made patient aware that is was more of an educational purpose but if she wanted to cancel appointment that would be fine. Will let provider know just in case.

## 2023-10-13 NOTE — TELEPHONE ENCOUNTER
Patient left message on machine - had genetic testing done, was positive,  completed testing and returned negative. Was advised prior to f/u with genetic counselor. Wondering if needs to keep that appt or if able to cancel d/t 's negative results.

## 2023-10-18 ENCOUNTER — ROUTINE PRENATAL (OUTPATIENT)
Dept: PERINATAL CARE | Facility: CLINIC | Age: 33
End: 2023-10-18
Payer: OTHER GOVERNMENT

## 2023-10-18 VITALS
HEIGHT: 65 IN | HEART RATE: 92 BPM | DIASTOLIC BLOOD PRESSURE: 70 MMHG | SYSTOLIC BLOOD PRESSURE: 122 MMHG | WEIGHT: 185.4 LBS | BODY MASS INDEX: 30.89 KG/M2

## 2023-10-18 DIAGNOSIS — Z3A.21 21 WEEKS GESTATION OF PREGNANCY: ICD-10-CM

## 2023-10-18 DIAGNOSIS — Z13.79 ENCOUNTER FOR GENETIC SCREENING: ICD-10-CM

## 2023-10-18 DIAGNOSIS — Z36.86 ENCOUNTER FOR ANTENATAL SCREENING FOR CERVICAL LENGTH: ICD-10-CM

## 2023-10-18 DIAGNOSIS — O99.210 OBESITY AFFECTING PREGNANCY, ANTEPARTUM, UNSPECIFIED OBESITY TYPE: Primary | ICD-10-CM

## 2023-10-18 PROCEDURE — 76811 OB US DETAILED SNGL FETUS: CPT | Performed by: OBSTETRICS & GYNECOLOGY

## 2023-10-18 PROCEDURE — 76817 TRANSVAGINAL US OBSTETRIC: CPT | Performed by: OBSTETRICS & GYNECOLOGY

## 2023-10-18 PROCEDURE — 99213 OFFICE O/P EST LOW 20 MIN: CPT | Performed by: OBSTETRICS & GYNECOLOGY

## 2023-10-18 NOTE — PROGRESS NOTES
The patient was seen today for an ultrasound. Please see ultrasound report (located under Ob Procedures) for additional details. Thank you very much for allowing us to participate in the care of this very nice patient. Should you have any questions, please do not hesitate to contact me. [place] [id number] [facility]  Attending Physician, [address] [place] [address]

## 2023-10-18 NOTE — PROGRESS NOTES
Ultrasound Probe Disinfection    A transvaginal ultrasound was performed. Prior to use, disinfection was performed with High Level Disinfection Process ([employer]). Probe serial number [id number]: [id number] was used.       Soo Machuca  10/18/23  11:29 AM

## 2023-10-23 ENCOUNTER — PATIENT MESSAGE (OUTPATIENT)
Dept: OBGYN CLINIC | Facility: CLINIC | Age: 33
End: 2023-10-23

## 2023-10-23 NOTE — LETTER
2023    [facility]  [address] [address]      To Whom This May Concern; The above named patient Arianna Mckenna (: 1990) is currently under my care. Cordelia Flores is currently 22 weeks pregnant. Sheila's mother was recently diagnosed with Guillain Manati syndrome. With reviewing data, it looks like research has not shown a correlation between family history of Guillain Manati and influenza vaccination. However, I cannot guarantee it being risk-free. It is my recommendation that Cordelia Flores be exempt from receiving the influenza vaccine for the 1670-3623 season due to significant risks related to her family history of Guillain Manati. If you have any questions or concerns, please don't hesitate to call.     Sincerely,       CHITO Esteves         CC: No Recipients

## 2023-10-24 NOTE — PATIENT COMMUNICATION
Spoke to patient - patients employer is requesting a more detailed letter for her exemption. Patient would like her pregnancy added into the letter along with her family history of Guillain Wilber syndrome. Letter created and sent via [employer].

## 2023-11-01 NOTE — PROGRESS NOTES
VISIT: Denies n/v/HA/cramping/vb/lof/edema/dv/smoking; urine neg/neg  Labs up to date; [facility] - level 2 done - follow-up growth 32 weeks;   PNVs + DHA - tolerating daily  Good FM    Encouraged hydration.  Encouraged the flu vaccine and COVID booster in pregnancy - declines due to mom diagnosed with Vaishali Schuler this past year  RTO in 4 weeks for routine ob check or sooner if needed

## 2023-11-02 ENCOUNTER — ROUTINE PRENATAL (OUTPATIENT)
Dept: OBGYN CLINIC | Facility: CLINIC | Age: 33
End: 2023-11-02

## 2023-11-02 VITALS
HEIGHT: 65 IN | BODY MASS INDEX: 32.02 KG/M2 | WEIGHT: 192.2 LBS | SYSTOLIC BLOOD PRESSURE: 118 MMHG | DIASTOLIC BLOOD PRESSURE: 72 MMHG

## 2023-11-02 DIAGNOSIS — Z34.82 ENCOUNTER FOR SUPERVISION OF NORMAL PREGNANCY IN MULTIGRAVIDA IN SECOND TRIMESTER: Primary | ICD-10-CM

## 2023-11-21 ENCOUNTER — ROUTINE PRENATAL (OUTPATIENT)
Dept: OBGYN CLINIC | Facility: CLINIC | Age: 33
End: 2023-11-21

## 2023-11-21 VITALS
HEIGHT: 65 IN | DIASTOLIC BLOOD PRESSURE: 64 MMHG | WEIGHT: 200.4 LBS | BODY MASS INDEX: 33.39 KG/M2 | SYSTOLIC BLOOD PRESSURE: 122 MMHG

## 2023-11-21 DIAGNOSIS — Z34.92 SECOND TRIMESTER PREGNANCY: Primary | ICD-10-CM

## 2023-11-21 PROCEDURE — PNV: Performed by: PHYSICIAN ASSISTANT

## 2023-11-21 NOTE — PROGRESS NOTES
OB/GYN  PN Visit  Toby Garrido  [id number]  2023  3:41 PM  Fernanda Petty PA-C    S: 34 y.o. Pedro Barlow 26w0d here for PN visit. Pregnancy uncomplicated. OB complaints:  Denies c/o n/v/ha, no edema, no smoking, no DV. No vb/lof  No cramping/ctxns or signs of PTL. She reports that she is feeling more fatigue. Pt notes that she is busy at work and doing renovations at home and feels like she has been having more pelvic pressure. We reviewed s/sx to call with as well as changes to pelvic that occur after initial pregnancy. O:    Pre-Merary Vitals      Flowsheet Row Most Recent Value   Prenatal Assessment    Fetal Heart Rate 142   Fundal Height (cm) 26 cm   Movement Present   Prenatal Vitals    Blood Pressure 122/64   Weight - Scale 90.9 kg (200 lb 6.4 oz)   Urine Albumin/Glucose    Dilation/Effacement/Station    Vaginal Drainage    Draining Fluid No   Edema    LLE Edema None   RLE Edema None   Facial Edema None              Gen: no acute distress, nonlabored breathing. OB exam completed: fundal height, +FHT. Urine: -/-    A/P:  #1. 26w0d GESTATION  Labor precautions reviewed  Fetal kick counts reviewed  Third Tri labs given.   Breast pump: ordered      RTC in 2 weeks    Fernanda Petty PA-C  2023  3:41 PM

## 2023-11-22 LAB
DME PARACHUTE DELIVERY DATE REQUESTED: NORMAL
DME PARACHUTE ITEM DESCRIPTION: NORMAL
DME PARACHUTE ORDER STATUS: NORMAL
DME PARACHUTE SUPPLIER NAME: NORMAL
DME PARACHUTE SUPPLIER PHONE: NORMAL

## 2023-12-02 LAB
BASOPHILS # BLD AUTO: 43 CELLS/UL (ref 0–200)
BASOPHILS NFR BLD AUTO: 0.5 %
EOSINOPHIL # BLD AUTO: 77 CELLS/UL (ref 15–500)
EOSINOPHIL NFR BLD AUTO: 0.9 %
ERYTHROCYTE [DISTWIDTH] IN BLOOD BY AUTOMATED COUNT: 11.8 % (ref 11–15)
GLUCOSE 1H P 50 G GLC PO SERPL-MCNC: 96 MG/DL
HCT VFR BLD AUTO: 33.5 % (ref 35–45)
HGB BLD-MCNC: 11.4 G/DL (ref 11.7–15.5)
LYMPHOCYTES # BLD AUTO: 1360 CELLS/UL (ref 850–3900)
LYMPHOCYTES NFR BLD AUTO: 16 %
MCH RBC QN AUTO: 31.2 PG (ref 27–33)
MCHC RBC AUTO-ENTMCNC: 34 G/DL (ref 32–36)
MCV RBC AUTO: 91.8 FL (ref 80–100)
MONOCYTES # BLD AUTO: 714 CELLS/UL (ref 200–950)
MONOCYTES NFR BLD AUTO: 8.4 %
NEUTROPHILS # BLD AUTO: 6307 CELLS/UL (ref 1500–7800)
NEUTROPHILS NFR BLD AUTO: 74.2 %
PLATELET # BLD AUTO: 239 THOUSAND/UL (ref 140–400)
PMV BLD REES-ECKER: 9.8 FL (ref 7.5–12.5)
RBC # BLD AUTO: 3.65 MILLION/UL (ref 3.8–5.1)
RPR SER QL: NORMAL
WBC # BLD AUTO: 8.5 THOUSAND/UL (ref 3.8–10.8)

## 2023-12-11 PROBLEM — I10 ELEVATED BLOOD PRESSURE READING IN OFFICE WITH DIAGNOSIS OF HYPERTENSION: Status: ACTIVE | Noted: 2023-12-11

## 2023-12-11 PROBLEM — Z3A.29 29 WEEKS GESTATION OF PREGNANCY: Status: ACTIVE | Noted: 2023-12-11

## 2023-12-11 NOTE — PROGRESS NOTES
OB/GYN  PN Visit  Harmony Craft  [id number]  2023  8:58 AM  CHITO Garcia    S: 34 y.o. [id number] 29w0d here for PN visit. Pregnancy complicated by elevated BP at M appt, and BMI 34. OB complaints:  Denies c/o n/v/ha, no edema, no smoking, no DV. No vb/lof  No cramping/ctxns or signs of PTL. O:    Pre- Vitals      Flowsheet Row Most Recent Value   Prenatal Assessment    Fetal Heart Rate 140   Fundal Height (cm) 29 cm   Movement Present   Prenatal Vitals    Blood Pressure 118/82   Weight - Scale 92.5 kg (204 lb)   Urine Albumin/Glucose    Dilation/Effacement/Station    Vaginal Drainage    Edema    LLE Edema None   RLE Edema None              Gen: no acute distress, nonlabored breathing. OB exam completed: fundal height, +FHT. Urine: -/-    A/P:  #1. 29w0d GESTATION  Labor precautions reviewed  Fetal kick counts reviewed  Tdap: she is considering. Flu: exempted due to family history of GBS in mother. Labs UTD. Yellow packet given and consents signed today. Breast pump: pump ordered. Pediatrician: +  Contraception: considering IUD.  No B/S      RTC in 2 weeks    CHITO Sweet  2023  8:58 AM

## 2023-12-12 ENCOUNTER — ROUTINE PRENATAL (OUTPATIENT)
Dept: OBGYN CLINIC | Facility: CLINIC | Age: 33
End: 2023-12-12

## 2023-12-12 VITALS
DIASTOLIC BLOOD PRESSURE: 82 MMHG | WEIGHT: 204 LBS | HEIGHT: 65 IN | SYSTOLIC BLOOD PRESSURE: 118 MMHG | BODY MASS INDEX: 33.99 KG/M2

## 2023-12-12 DIAGNOSIS — I10 ELEVATED BLOOD PRESSURE READING IN OFFICE WITH DIAGNOSIS OF HYPERTENSION: ICD-10-CM

## 2023-12-12 DIAGNOSIS — Z3A.29 29 WEEKS GESTATION OF PREGNANCY: Primary | ICD-10-CM

## 2023-12-12 PROCEDURE — PNV

## 2023-12-27 NOTE — PROGRESS NOTES
OB/GYN  PN Visit  Sheila Young  [id number]  2023  2:35 PM  CHITO Cruz    S: 33 y.o.  31w2d here for PN visit. Pregnancy complicated by elevated BP at [facility] appt, and BMI 34.     OB complaints:  Denies c/o n/v/ha, no edema, no smoking, no DV.   No vb/lof  No cramping/ctxns or signs of PTL.    She reports muscle pain above belly button; especially, with movement and activity. Reviewed diastasis recti.     O:    Pre- Vitals      Flowsheet Row Most Recent Value   Prenatal Assessment    Fetal Heart Rate 145   Fundal Height (cm) 31 cm   Movement Present   Prenatal Vitals    Blood Pressure 134/70   Weight - Scale 95.7 kg (211 lb)   Urine Albumin/Glucose    Dilation/Effacement/Station    Vaginal Drainage    Edema               Gen: no acute distress, nonlabored breathing.  OB exam completed: fundal height, +FHT.  Urine: -/-    A/P:  #1. 31w2d GESTATION  Labor precautions reviewed  Fetal kick counts reviewed  Tdap: desires; given today.  Flu: exempted due to family history of GBS in mother.  Labs UTD.  Yellow packet given and consents signed.    Breast pump: pump ordered.  Pediatrician: +  Contraception: considering IUD. No B/S    RTC in 2 weeks    CHITO Cruz  2023  2:35 PM

## 2023-12-28 ENCOUNTER — ROUTINE PRENATAL (OUTPATIENT)
Dept: OBGYN CLINIC | Facility: CLINIC | Age: 33
End: 2023-12-28
Payer: OTHER GOVERNMENT

## 2023-12-28 VITALS
WEIGHT: 211 LBS | BODY MASS INDEX: 35.16 KG/M2 | HEIGHT: 65 IN | DIASTOLIC BLOOD PRESSURE: 70 MMHG | SYSTOLIC BLOOD PRESSURE: 134 MMHG

## 2023-12-28 DIAGNOSIS — Z3A.31 31 WEEKS GESTATION OF PREGNANCY: Primary | ICD-10-CM

## 2023-12-28 DIAGNOSIS — Z23 ENCOUNTER FOR IMMUNIZATION: ICD-10-CM

## 2023-12-28 PROCEDURE — 90471 IMMUNIZATION ADMIN: CPT

## 2023-12-28 PROCEDURE — PNV

## 2023-12-28 PROCEDURE — 90715 TDAP VACCINE 7 YRS/> IM: CPT

## 2023-12-28 NOTE — PROGRESS NOTES
Tdap given in left deltoid per pt request. No previous history of any reactions to tdap or any other immunization. Pt tolerated well. VIS given at time of administration.   Valorie MICHEL

## 2023-12-29 PROBLEM — Z00.00 ENCOUNTER FOR WELL ADULT EXAM WITHOUT ABNORMAL FINDINGS: Status: RESOLVED | Noted: 2019-10-23 | Resolved: 2023-12-29

## 2024-01-03 ENCOUNTER — ULTRASOUND (OUTPATIENT)
Dept: PERINATAL CARE | Facility: CLINIC | Age: 34
End: 2024-01-03
Payer: OTHER GOVERNMENT

## 2024-01-03 VITALS
BODY MASS INDEX: 34.72 KG/M2 | HEIGHT: 65 IN | SYSTOLIC BLOOD PRESSURE: 136 MMHG | HEART RATE: 91 BPM | WEIGHT: 208.4 LBS | DIASTOLIC BLOOD PRESSURE: 80 MMHG

## 2024-01-03 DIAGNOSIS — Z3A.32 32 WEEKS GESTATION OF PREGNANCY: ICD-10-CM

## 2024-01-03 DIAGNOSIS — Z36.89 ENCOUNTER FOR ULTRASOUND TO ASSESS FETAL GROWTH: Primary | ICD-10-CM

## 2024-01-03 PROBLEM — R03.0 ELEVATED BLOOD PRESSURE READING IN OFFICE WITHOUT DIAGNOSIS OF HYPERTENSION: Status: ACTIVE | Noted: 2023-12-11

## 2024-01-03 PROCEDURE — 76816 OB US FOLLOW-UP PER FETUS: CPT | Performed by: OBSTETRICS & GYNECOLOGY

## 2024-01-03 PROCEDURE — 99213 OFFICE O/P EST LOW 20 MIN: CPT | Performed by: NURSE PRACTITIONER

## 2024-01-04 NOTE — PROGRESS NOTES
Sheila Young has no complaints today.  She reports regular fetal movements and does not report any problems.  She is here today at 32w1d for evaluation of fetal weight and amniotic fluid. Her glucola was 96 on 12/6/2023. 32 lb weight gain.         Problem list:  1.  Elevated blood pressure x 1 without diagnosis of hypertension  ([facility] appointment 8/22/23).        Ultrasound findings:  A viable cm intrauterine pregnancy is seen. Estimated fetal weight and amniotic fluid are within normal limits for gestational age.  Growth is at the 63rd percentile no fetal anomalies are visualized on limited survey. Placenta is within normal limits.        Pregnancy ultrasound has limitations and is unable to detect all forms of fetal congenital abnormalities.  The inaccuracy in the EFW can be off by 1 lb either way in the third trimester.       Follow up recommended:   No further ultrasounds are recommended at this time. Fetal kickcounting was recommended.  Routine prenatal care is advised. Please refer her back to our office as clinically indicated.     Split-shared decision-making between CHITO Julian and myself was utilized, with the majority of the time spent by ROSIE Julian.  Medical decision-making for this encounter was low (diagnosis low, data limited and risk low)  .  Procedures that were completed today were charged separately.     I reviewed the ultrasound pictures and recommended the medical decision making transcribed in the care of this patient.      Cecy Saldana M.D.

## 2024-01-08 NOTE — PROGRESS NOTES
OB/GYN  PN Visit  Sheila Young  [id number]  2024  3:17 PM  CHITO Cruz    S: 33 y.o.  33w2d here for PN visit. Pregnancy complicated by elevated BP at [facility] appt, and BMI 35.  Breech presentation at 32 week growth scan.    OB complaints:  Denies c/o n/v/ha, no edema, no smoking, no DV.   No vb/lof  No cramping/ctxns or signs of PTL.        O:    Pre- Vitals      Flowsheet Row Most Recent Value   Prenatal Assessment    Fetal Heart Rate 135   Fundal Height (cm) 33 cm   Movement Present   Presentation Vertex  [by US]   Prenatal Vitals    Blood Pressure 122/74   Weight - Scale 95.8 kg (211 lb 3.2 oz)   Urine Albumin/Glucose    Dilation/Effacement/Station    Vaginal Drainage    Edema               Gen: no acute distress, nonlabored breathing.  OB exam completed: fundal height, +FHT.  Urine: -/-    A/P:  #1. 33w2d GESTATION  Labor precautions reviewed  Fetal kick counts reviewed  Tdap: desires; given today.  Flu: exempted due to family history of GBS in mother.  Labs UTD.  Yellow packet given and consents signed.    Breast pump: pump ordered. Reordered today  Pediatrician: +  Contraception: considering IUD. No B/S    RTC in 2 weeks    CHITO Cruz  2024  3:17 PM

## 2024-01-11 ENCOUNTER — ROUTINE PRENATAL (OUTPATIENT)
Dept: OBGYN CLINIC | Facility: CLINIC | Age: 34
End: 2024-01-11

## 2024-01-11 VITALS
WEIGHT: 211.2 LBS | BODY MASS INDEX: 35.19 KG/M2 | DIASTOLIC BLOOD PRESSURE: 74 MMHG | SYSTOLIC BLOOD PRESSURE: 122 MMHG | HEIGHT: 65 IN

## 2024-01-11 DIAGNOSIS — R03.0 ELEVATED BLOOD PRESSURE READING IN OFFICE WITHOUT DIAGNOSIS OF HYPERTENSION: ICD-10-CM

## 2024-01-11 DIAGNOSIS — Z3A.33 33 WEEKS GESTATION OF PREGNANCY: Primary | ICD-10-CM

## 2024-01-11 PROCEDURE — PNV

## 2024-01-18 ENCOUNTER — TELEPHONE (OUTPATIENT)
Age: 34
End: 2024-01-18

## 2024-01-18 NOTE — TELEPHONE ENCOUNTER
Last office visit was 5/16/2022.  She will need to schedule appointment with one of our providers for referral.

## 2024-01-18 NOTE — TELEPHONE ENCOUNTER
Patient called asking for a pelvic floor physical therapy. Per patient her GYN referred her to one already, however her insurance will not fully cover the visit unless she gets a referral from her PCP as well.

## 2024-01-22 ENCOUNTER — OFFICE VISIT (OUTPATIENT)
Age: 34
End: 2024-01-22
Payer: OTHER GOVERNMENT

## 2024-01-22 VITALS
TEMPERATURE: 98.4 F | BODY MASS INDEX: 35.15 KG/M2 | HEIGHT: 65 IN | OXYGEN SATURATION: 98 % | HEART RATE: 88 BPM | SYSTOLIC BLOOD PRESSURE: 106 MMHG | DIASTOLIC BLOOD PRESSURE: 68 MMHG

## 2024-01-22 DIAGNOSIS — Z3A.35 35 WEEKS GESTATION OF PREGNANCY: Primary | ICD-10-CM

## 2024-01-22 DIAGNOSIS — O99.013 ANEMIA AFFECTING PREGNANCY IN THIRD TRIMESTER: ICD-10-CM

## 2024-01-22 PROBLEM — R03.0 ELEVATED BLOOD PRESSURE READING IN OFFICE WITHOUT DIAGNOSIS OF HYPERTENSION: Status: RESOLVED | Noted: 2023-12-11 | Resolved: 2024-01-22

## 2024-01-22 PROCEDURE — 99213 OFFICE O/P EST LOW 20 MIN: CPT | Performed by: INTERNAL MEDICINE

## 2024-01-22 NOTE — PROGRESS NOTES
"Name: Sheila Young      : 1990      MRN: [id number]  Encounter Provider: Dexter Melgar MD  Encounter Date: 2024   Encounter department: [facility] PRIMARY [facility] [place]    Assessment & Plan     1. 35 weeks gestation of pregnancy  Assessment & Plan:  Presented to establish care. No acute issues other than 35 weeks gestation of pregnancy and mild anemia with hgb 11.4      2. Anemia affecting pregnancy in third trimester  Assessment & Plan:  Continue prenatal vitamins               Subjective      The patient presents to establish primary care.  She was notified by her insurance company that she needs to have a primary care physician on board.  She is 35 weeks gestation in pregnancy.  She has had no issues during the pregnancy.  Her most recent labs in December disclosed a mild anemia with a hemoglobin of 11.4 with normocytic normochromic indices.  She has no particular complaints at this time.      Review of Systems   Constitutional: Negative.  Negative for activity change, appetite change, chills, diaphoresis, fatigue, fever and unexpected weight change.   HENT: Negative.     Eyes: Negative.    Respiratory: Negative.     Cardiovascular: Negative.    Gastrointestinal: Negative.    Endocrine: Negative.    Genitourinary: Negative.    Musculoskeletal: Negative.    Skin: Negative.    Neurological: Negative.    Hematological: Negative.    Psychiatric/Behavioral:  The patient is not nervous/anxious.        Current Outpatient Medications on File Prior to Visit   Medication Sig    docusate sodium (COLACE) 100 mg capsule Take 100 mg by mouth 2 (two) times a day    Prenatal MV-Min-Fe Fum-FA-DHA (PRENATAL+DHA PO) Take 2 tablets by mouth daily       Objective     /68 (BP Location: Left arm, Patient Position: Sitting, Cuff Size: Standard)   Pulse 88   Temp 98.4 °F (36.9 °C) (Temporal)   Ht 5' 5\" (1.651 m)   LMP 2023   SpO2 98%   BMI 35.15 kg/m²     Physical " Exam  Vitals reviewed.   Constitutional:       General: She is not in acute distress.     Appearance: Normal appearance. She is not ill-appearing, toxic-appearing or diaphoretic.   HENT:      Head: Normocephalic and atraumatic.      Right Ear: External ear normal.      Left Ear: External ear normal.      Nose: Nose normal.   Eyes:      General: No scleral icterus.     Conjunctiva/sclera: Conjunctivae normal.      Pupils: Pupils are equal, round, and reactive to light.   Cardiovascular:      Rate and Rhythm: Normal rate and regular rhythm.      Heart sounds: Normal heart sounds. No murmur heard.  Pulmonary:      Effort: Pulmonary effort is normal. No respiratory distress.      Breath sounds: Normal breath sounds.   Abdominal:      Tenderness: There is no abdominal tenderness.      Comments: Gravid   Musculoskeletal:         General: No swelling.      Cervical back: Neck supple.      Right lower leg: No edema.      Left lower leg: No edema.   Skin:     General: Skin is warm.      Coloration: Skin is not jaundiced.      Findings: No bruising, erythema or rash.   Neurological:      General: No focal deficit present.      Mental Status: She is alert and oriented to person, place, and time. Mental status is at baseline.   Psychiatric:         Mood and Affect: Mood normal.         Behavior: Behavior normal.         Thought Content: Thought content normal.         Judgment: Judgment normal.       Dexter Melgar MD

## 2024-01-22 NOTE — ASSESSMENT & PLAN NOTE
Presented to establish care. No acute issues other than 35 weeks gestation of pregnancy and mild anemia with hgb 11.4

## 2024-01-22 NOTE — PROGRESS NOTES
PT Evaluation     Today's date: 2024  Patient name: Sheila Young  : 1990  MRN: [id number]  Referring provider: Clary Bauer CRNP  Dx:   Encounter Diagnosis     ICD-10-CM    1. 31 weeks gestation of pregnancy  Z3A.31 Ambulatory Referral to Physical Therapy          Start Time: 1800  Stop Time: 1840  Total time in clinic (min): 40 minutes    Assessment  Assessment details: Patient is a 34 y.o. female  35 weeks pregnant with c/o of L SIJ pain with sleeping. Patient's symptoms limit her sleep but otherwise all other activity is pain free. Educated patient on positioning options in sidelying and towards supine that can improve her comfort. Patient to contact this PT regarding her status with these positioning options. Patient to f/u PRN for any pregnancy related symptoms.   Understanding of Dx/Px/POC: good   Prognosis: good    Goals  1. Patient will report <4/10 L SIJ pain with sleeping.    Plan  Patient would benefit from: skilled physical therapy  Planned modality interventions: cryotherapy and thermotherapy: hydrocollator packs  Planned therapy interventions: abdominal trunk stabilization, joint mobilization, manual therapy, activity modification, balance, balance/weight bearing training, neuromuscular re-education, body mechanics training, postural training, patient education, therapeutic activities, therapeutic exercise, functional ROM exercises, strengthening, stretching, transfer training, gait training, home exercise program and breathing training  Frequency: 1x month  Duration in weeks: 4  Plan of Care beginning date: 2024  Plan of Care expiration date: 2024  Treatment plan discussed with: patient      PT Pelvic Floor Subjective:   History of Present Illness:   Patient is 35 weeks pregnant, ROSAURA 24. Birth plan: open at [place]    Reports having nocturnal L SIJ pain with sleeping only. Worst with lying on her L side.   Ease factors: ice, sitting up without back  support, walking      Social Support:     Lives in:  Multiple-level home    Lives with:  Spouse and young children    Relationship status: /committed    Work status: employed full time (PT at [employer])  Diet and Exercise:      Exercise type: walking  OB/ gyn History    Gestational History:     Number of prior pregnancies: 2    Number of term pregnancies: 1  Pain:     Current pain ratin  Patient Goals:     Patient goals for therapy:  Improved sleep    Other patient goals:  Decrease pain with sleeping    Objective     Active Range of Motion     Lumbar   Flexion:  Restriction level: minimal  Extension:  Restriction level: moderate  Left lateral flexion:  Restriction level: moderate  Right lateral flexion:  Restriction level: moderate             POC expires Unit limit Auth Expiration date PT/OT/ST + Visit Limit?   24                            Visit/Unit Tracking  AUTH Status:  Date              Pending Used 1              Remaining                 Diagnosis: Pregnancy related SIJ pain   Precautions: pregnant   Insurance: [employer]              Vitals 114/66    SpO2 98          Patient Ed           Postpartum PT Educated                                                                            Neuro Re-Ed                                                                                        Ther Ex           Aerobic conditioning                                                                                        Ther Activity           Positioning Sidelying + pillow under hip, long pillow between knees, support under belly  Quarter turns to supine                      Manual                                                                  Modalities

## 2024-01-23 ENCOUNTER — ROUTINE PRENATAL (OUTPATIENT)
Dept: OBGYN CLINIC | Facility: CLINIC | Age: 34
End: 2024-01-23

## 2024-01-23 ENCOUNTER — EVALUATION (OUTPATIENT)
Dept: PHYSICAL THERAPY | Facility: REHABILITATION | Age: 34
End: 2024-01-23
Payer: OTHER GOVERNMENT

## 2024-01-23 VITALS
DIASTOLIC BLOOD PRESSURE: 76 MMHG | SYSTOLIC BLOOD PRESSURE: 120 MMHG | HEIGHT: 65 IN | WEIGHT: 213 LBS | BODY MASS INDEX: 35.49 KG/M2

## 2024-01-23 DIAGNOSIS — Z34.93 PRENATAL CARE, THIRD TRIMESTER: ICD-10-CM

## 2024-01-23 DIAGNOSIS — Z3A.35 35 WEEKS GESTATION OF PREGNANCY: Primary | ICD-10-CM

## 2024-01-23 DIAGNOSIS — Z3A.31 31 WEEKS GESTATION OF PREGNANCY: ICD-10-CM

## 2024-01-23 PROCEDURE — 97161 PT EVAL LOW COMPLEX 20 MIN: CPT | Performed by: PHYSICAL THERAPIST

## 2024-01-23 PROCEDURE — PNV: Performed by: STUDENT IN AN ORGANIZED HEALTH CARE EDUCATION/TRAINING PROGRAM

## 2024-01-23 PROCEDURE — 97530 THERAPEUTIC ACTIVITIES: CPT | Performed by: PHYSICAL THERAPIST

## 2024-01-23 NOTE — PROGRESS NOTES
Sheila is a 34 y.o.  35w0d. Reports ++FM, no LOF, VB, or regular contractions.     SI joint pain most night--mod to severe low back pain    Vitals:    24 1600   BP: 120/75     S=D  +FHTs    A/P:  elevated BPs at MFM  SMA carrier-FOB negative  +GBS    Third tri labs completed  Rh status POS    TDAP vaccine--completed  RSV vaccine--mother had GBS  Picked pediatrician    Breastfeeding: yes, waiting for order    Discussed pre-term labor precautions  Return to office in 2 weeks

## 2024-01-23 NOTE — TELEPHONE ENCOUNTER
Patient has called requesting for referral to be placed. She does have an appointment made for 01/23/2024 at 6pm.

## 2024-01-23 NOTE — TELEPHONE ENCOUNTER
PLEASE ADVISE, PATIENT ACTUALLY NEEDS AN INSURANCE REFERRAL. Has an appointment on 01/23/2024 at 6pm, will cancel if it is not placed.

## 2024-01-23 NOTE — TELEPHONE ENCOUNTER
Called Onel, who informed me that patient has a select plan and does not require referral for physical therapy.  Reference number for the call is [id number].  Mirian from [facility] physical therapy was called and notified.

## 2024-01-30 ENCOUNTER — ROUTINE PRENATAL (OUTPATIENT)
Dept: OBGYN CLINIC | Facility: CLINIC | Age: 34
End: 2024-01-30

## 2024-01-30 VITALS — DIASTOLIC BLOOD PRESSURE: 72 MMHG | SYSTOLIC BLOOD PRESSURE: 110 MMHG | BODY MASS INDEX: 35.94 KG/M2 | WEIGHT: 216 LBS

## 2024-01-30 DIAGNOSIS — Z34.93 PRENATAL CARE IN THIRD TRIMESTER: Primary | ICD-10-CM

## 2024-01-30 DIAGNOSIS — Z3A.36 36 WEEKS GESTATION OF PREGNANCY: ICD-10-CM

## 2024-01-30 PROCEDURE — PNV: Performed by: STUDENT IN AN ORGANIZED HEALTH CARE EDUCATION/TRAINING PROGRAM

## 2024-01-30 PROCEDURE — 87150 DNA/RNA AMPLIFIED PROBE: CPT | Performed by: STUDENT IN AN ORGANIZED HEALTH CARE EDUCATION/TRAINING PROGRAM

## 2024-01-30 NOTE — PATIENT INSTRUCTIONS
Pregnancy at 35 to 38 Weeks   AMBULATORY CARE:   Changes happening with your body:  You are considered full term at the beginning of 37 weeks. Your breathing may be easier if your baby has moved down into a head-down position. You may need to urinate more often because the baby may be pressing on your bladder. You may also feel more discomfort and get tired easily.   Seek care immediately if:   You develop a severe headache that does not go away.    You have new or increased vision changes, such as blurred or spotted vision.    You have new or increased swelling in your face or hands.    You have vaginal spotting or bleeding.    Your water broke or you feel warm water gushing or trickling from your vagina.    Call your obstetrician if:   You have more than 5 contractions in 1 hour.    You notice any changes in your baby's movements.    You have abdominal cramps, pressure, or tightening.    You have a change in vaginal discharge.    You have chills or a fever.    You have vaginal itching, burning, or pain.    You have yellow, green, white, or foul-smelling vaginal discharge.    You have pain or burning when you urinate, less urine than usual, or pink or bloody urine.    You have questions or concerns about your condition or care.    How to care for yourself at this stage of your pregnancy:       Eat a variety of healthy foods.  Healthy foods include fruits, vegetables, whole-grain breads, low-fat dairy foods, beans, lean meats, and fish. Drink liquids as directed. Ask how much liquid to drink each day and which liquids are best for you. Limit caffeine to less than 200 milligrams each day. Limit your intake of fish to 2 servings each week. Choose fish low in mercury such as canned light tuna, shrimp, salmon, cod, or tilapia. Do not  eat fish high in mercury such as swordfish, tilefish, bernard mackerel, and shark.         Take prenatal vitamins as directed.  Your need for certain vitamins and minerals, such as folic  acid, increases during pregnancy. Prenatal vitamins provide some of the extra vitamins and minerals you need. Prenatal vitamins may also help to decrease the risk of certain birth defects.         Rest as needed.  Put your feet up if you have swelling in your ankles and feet.         Talk to your healthcare provider about exercise.  Moderate exercise can help you stay fit. Your healthcare provider will help you plan an exercise program that is safe for you during pregnancy.         Do not smoke.  Smoking increases your risk of a miscarriage and other health problems during your pregnancy. Smoking can cause your baby to be born early or weigh less at birth. Ask your healthcare provider for information if you need help quitting.    Do not drink alcohol.  Alcohol passes from your body to your baby through the placenta. It can affect your baby's brain development and cause fetal alcohol syndrome (FAS). FAS is a group of conditions that causes mental, behavior, and growth problems.    Talk to your healthcare provider before you take any medicines.  Many medicines may harm your baby if you take them when you are pregnant. Do not take any medicines, vitamins, herbs, or supplements without first talking to your healthcare provider. Never use illegal or street drugs (such as marijuana or cocaine) while you are pregnant.  Safety tips during pregnancy:   Avoid hot tubs and saunas.  Do not use a hot tub or sauna while you are pregnant, especially during your first trimester. Hot tubs and saunas may raise your baby's temperature and increase the risk of birth defects.    Avoid toxoplasmosis.  This is an infection caused by eating raw meat or being around infected cat feces. It can cause birth defects, miscarriages, and other problems. Wash your hands after you touch raw meat. Make sure any meat is well-cooked before you eat it. Avoid raw eggs and unpasteurized milk. Use gloves or ask someone else to clean your cat's litter box  while you are pregnant.         Ask your healthcare provider about travel.  The most comfortable time to travel is during the second trimester. Ask your provider if you can travel after 36 weeks. You may not be able to travel in an airplane after 36 weeks. He or she may also recommend you avoid long road trips.    Changes happening with your baby:  By 38 weeks, your baby may weigh between 6 and 9 pounds. Your baby may be about 14 inches long from the top of the head to the rump (baby's bottom). Your baby hears well enough to know your voice. As your baby gets larger, you may feel fewer kicks and more stretching and rolling. Your baby may move into a head-down position. Your baby will also rest lower in your abdomen.  What you need to know about prenatal care:  Your healthcare provider will check your blood pressure and weight. You may also need the following:  A urine test  may also be done to check for sugar and protein. These can be signs of gestational diabetes or infection. Protein in your urine may also be a sign of preeclampsia. Preeclampsia is a condition that can develop during week 20 or later of your pregnancy. It causes high blood pressure, and it can cause problems with your kidneys and other organs.    A gestational diabetes screen  may be done. Your healthcare provider may order either a 1-step or 2-step oral glucose tolerance test (OGTT).     1-step OGTT:  Your blood sugar level will be tested after you have not eaten for 8 hours (fasting). You will then be given a glucose drink. Your level will be tested again 1 hour and 2 hours after you finish the drink.    2-step OGTT:  You do not have to fast for the first part of the test. You will have the glucose drink at any time of day. Your blood sugar level will be checked 1 hour later. If your blood sugar is higher than a certain level, another test will be ordered. You will fast and your blood sugar level will be tested. You will have the glucose drink.  Your blood will be tested again 1 hour, 2 hours, and 3 hours after you finish the glucose drink.    A blood test  may be done to check for anemia (low iron level).    A Tdap vaccine  may be recommended by your healthcare provider.    A group B strep test  is a test that is done to check for group B strep infection. Group B strep is a type of bacteria that may be found in the vagina or rectum. It can be passed to your baby during delivery if you have it. Your healthcare provider will take swab your vagina or rectum and send the sample to the lab for tests.    Fundal height  is a measurement of your uterus to check your baby's growth. This number is usually the same as the number of weeks that you have been pregnant. Your healthcare provider may also check your baby's position.    Your baby's heart rate  will be checked.    Follow up with your obstetrician as directed:  Write down your questions so you remember to ask them during your visits.  © Copyright [employer] 2023 Information is for End User's use only and may not be sold, redistributed or otherwise used for commercial purposes.  The above information is an  only. It is not intended as medical advice for individual conditions or treatments. Talk to your doctor, nurse or pharmacist before following any medical regimen to see if it is safe and effective for you.

## 2024-01-30 NOTE — PROGRESS NOTES
Sheila is a 35 yo  at 36 weeks gestation presenting for routine PNC- she denies any regular CTX, LOF or VB. Endorses regular FM. Urine neg/ neg.  Fetus is cephalic today on TAUS.  GBS collected today and reviewed- no PCN allergy.  RTO in 1 week.

## 2024-02-01 LAB
DME PARACHUTE DELIVERY DATE ACTUAL: NORMAL
DME PARACHUTE DELIVERY DATE REQUESTED: NORMAL
DME PARACHUTE ITEM DESCRIPTION: NORMAL
DME PARACHUTE ORDER STATUS: NORMAL
DME PARACHUTE SUPPLIER NAME: NORMAL
DME PARACHUTE SUPPLIER PHONE: NORMAL
GP B STREP DNA SPEC QL NAA+PROBE: NEGATIVE

## 2024-02-08 ENCOUNTER — ROUTINE PRENATAL (OUTPATIENT)
Dept: OBGYN CLINIC | Facility: CLINIC | Age: 34
End: 2024-02-08

## 2024-02-08 VITALS
WEIGHT: 218 LBS | HEIGHT: 65 IN | SYSTOLIC BLOOD PRESSURE: 116 MMHG | DIASTOLIC BLOOD PRESSURE: 72 MMHG | BODY MASS INDEX: 36.32 KG/M2

## 2024-02-08 DIAGNOSIS — Z34.93 PRENATAL CARE IN THIRD TRIMESTER: Primary | ICD-10-CM

## 2024-02-08 PROCEDURE — PNV: Performed by: OBSTETRICS & GYNECOLOGY

## 2024-02-08 NOTE — PROGRESS NOTES
Patient reports good fm, no v, headache, cramping, bleeding, loss of fluid, dom violence, or smoking.  kate pnv, Labor Talk done would like epidural, reviewed signs of labor,  how to call, has car seat.  GBS negative urine negative negative return in 1 week or sooner as needed

## 2024-02-15 ENCOUNTER — ROUTINE PRENATAL (OUTPATIENT)
Dept: OBGYN CLINIC | Facility: CLINIC | Age: 34
End: 2024-02-15

## 2024-02-15 VITALS
BODY MASS INDEX: 35.99 KG/M2 | SYSTOLIC BLOOD PRESSURE: 128 MMHG | DIASTOLIC BLOOD PRESSURE: 80 MMHG | HEIGHT: 65 IN | WEIGHT: 216 LBS

## 2024-02-15 DIAGNOSIS — Z34.83 ENCOUNTER FOR SUPERVISION OF NORMAL PREGNANCY IN MULTIGRAVIDA IN THIRD TRIMESTER: Primary | ICD-10-CM

## 2024-02-15 PROCEDURE — PNV: Performed by: PHYSICIAN ASSISTANT

## 2024-02-15 NOTE — PROGRESS NOTES
VISIT 34 yr old  at 38w2d: (+) edema - mild tightness hands and feet; Denies n/v/HA/cramping/vb/lof/dv/smoking; urine neg/neg; GBS neg; vtx  Labs up to date; PNC - no further ultrasounds;  PNVs + DHA - tolerating daily  Good FM - r/chago 10 kicks/2 hrs     Encouraged hydration. Encouraged handwashing/infection prevention  Reviewed signs and symptoms of labor and when to call; Reviewed signs and symptoms of pregnancy induced hypertension or preeclampsia and when to call  Would like to go into labor naturally - will be open to considering induction if/when she goes past ROSAURA  RTO in 1 weeks for routine ob check or sooner if needed

## 2024-02-19 ENCOUNTER — ROUTINE PRENATAL (OUTPATIENT)
Dept: OBGYN CLINIC | Facility: CLINIC | Age: 34
End: 2024-02-19
Payer: OTHER GOVERNMENT

## 2024-02-19 ENCOUNTER — TELEPHONE (OUTPATIENT)
Dept: OBGYN CLINIC | Facility: CLINIC | Age: 34
End: 2024-02-19

## 2024-02-19 VITALS — BODY MASS INDEX: 36.28 KG/M2 | DIASTOLIC BLOOD PRESSURE: 78 MMHG | WEIGHT: 218 LBS | SYSTOLIC BLOOD PRESSURE: 122 MMHG

## 2024-02-19 DIAGNOSIS — Z34.93 PRENATAL CARE IN THIRD TRIMESTER: Primary | ICD-10-CM

## 2024-02-19 PROCEDURE — 59426 ANTEPARTUM CARE ONLY: CPT | Performed by: OBSTETRICS & GYNECOLOGY

## 2024-02-19 PROCEDURE — PNV: Performed by: OBSTETRICS & GYNECOLOGY

## 2024-02-19 RX ORDER — CALCIUM CARBONATE 500 MG/1
1 TABLET, CHEWABLE ORAL DAILY
COMMUNITY

## 2024-02-19 NOTE — TELEPHONE ENCOUNTER
----- Message from Neeru Kim MD sent at 2/19/2024 12:08 PM EST -----  Regarding: IOL  Sorry forgot third staff members name.  Sheila would like IOL March 1, one day.  She will be 40 3/7 and would desire elective iol.

## 2024-02-19 NOTE — PROGRESS NOTES
Patient reports good fm, no n/v, headache, cramping, bleeding, loss of fluid, edema, dom violence, or smoking.  kate pnv. Urine neg/neg  desire iol a few days after edc if not delivered.  Message sent to staff.

## 2024-02-19 NOTE — TELEPHONE ENCOUNTER
Spoke to L&D, 3/1/24 not available at this time. Patient is scheduled for IOL 2/27/24 at 7am. Reviewed with patient, will keep as scheduled at this time but will review with L&D for change in availability as patient prefers 3/1 or 3/2. Will update patient later this week regarding availability and to confirm details. Patient will keep appts in office as scheduled at this time as well.

## 2024-02-23 NOTE — TELEPHONE ENCOUNTER
Spoke to GB at L&D. Patient is scheduled for eIOL 3/2/24 at 0700.  Provider notified via staff message. Induction calendar updated. Pink sticky updated. Placed call to patient and reviewed scheduling details and patient confirms, will keep ob appt 2/29/24 as previously scheduled, and patient has no questions at this time.

## 2024-02-24 ENCOUNTER — NURSE TRIAGE (OUTPATIENT)
Dept: OTHER | Facility: OTHER | Age: 34
End: 2024-02-24

## 2024-02-24 ENCOUNTER — HOSPITAL ENCOUNTER (INPATIENT)
Facility: HOSPITAL | Age: 34
LOS: 2 days | Discharge: HOME | End: 2024-02-26
Attending: OBSTETRICS & GYNECOLOGY | Admitting: STUDENT IN AN ORGANIZED HEALTH CARE EDUCATION/TRAINING PROGRAM
Payer: OTHER GOVERNMENT

## 2024-02-24 ENCOUNTER — ANESTHESIA EVENT (INPATIENT)
Dept: ANESTHESIOLOGY | Facility: HOSPITAL | Age: 34
End: 2024-02-24
Payer: OTHER GOVERNMENT

## 2024-02-24 ENCOUNTER — ANESTHESIA (INPATIENT)
Dept: ANESTHESIOLOGY | Facility: HOSPITAL | Age: 34
End: 2024-02-24
Payer: OTHER GOVERNMENT

## 2024-02-24 DIAGNOSIS — Z3A.36 36 WEEKS GESTATION OF PREGNANCY: Primary | ICD-10-CM

## 2024-02-24 PROBLEM — Z37.9 NORMAL LABOR: Status: ACTIVE | Noted: 2024-02-24

## 2024-02-24 LAB
ABO GROUP BLD: NORMAL
ALBUMIN SERPL BCP-MCNC: 3.7 G/DL (ref 3.5–5)
ALP SERPL-CCNC: 99 U/L (ref 34–104)
ALT SERPL W P-5'-P-CCNC: 11 U/L (ref 7–52)
ANION GAP SERPL CALCULATED.3IONS-SCNC: 10 MMOL/L
AST SERPL W P-5'-P-CCNC: 15 U/L (ref 13–39)
BILIRUB SERPL-MCNC: 0.6 MG/DL (ref 0.2–1)
BLD GP AB SCN SERPL QL: NEGATIVE
BUN SERPL-MCNC: 9 MG/DL (ref 5–25)
CALCIUM SERPL-MCNC: 9.4 MG/DL (ref 8.4–10.2)
CHLORIDE SERPL-SCNC: 106 MMOL/L (ref 96–108)
CO2 SERPL-SCNC: 20 MMOL/L (ref 21–32)
CREAT SERPL-MCNC: 0.61 MG/DL (ref 0.6–1.3)
CREAT UR-MCNC: 20.2 MG/DL
ERYTHROCYTE [DISTWIDTH] IN BLOOD BY AUTOMATED COUNT: 12.5 % (ref 11.6–15.1)
GFR SERPL CREATININE-BSD FRML MDRD: 120 ML/MIN/1.73SQ M
GLUCOSE SERPL-MCNC: 93 MG/DL (ref 65–140)
HCT VFR BLD AUTO: 36.3 % (ref 34.8–46.1)
HGB BLD-MCNC: 12.2 G/DL (ref 11.5–15.4)
MCH RBC QN AUTO: 30.1 PG (ref 26.8–34.3)
MCHC RBC AUTO-ENTMCNC: 33.6 G/DL (ref 31.4–37.4)
MCV RBC AUTO: 90 FL (ref 82–98)
PLATELET # BLD AUTO: 227 THOUSANDS/UL (ref 149–390)
PMV BLD AUTO: 9.6 FL (ref 8.9–12.7)
POTASSIUM SERPL-SCNC: 3.6 MMOL/L (ref 3.5–5.3)
PROT SERPL-MCNC: 7 G/DL (ref 6.4–8.4)
PROT UR-MCNC: 4 MG/DL
PROT/CREAT UR: 0.2 MG/G{CREAT} (ref 0–0.1)
RBC # BLD AUTO: 4.05 MILLION/UL (ref 3.81–5.12)
RH BLD: POSITIVE
SODIUM SERPL-SCNC: 136 MMOL/L (ref 135–147)
SPECIMEN EXPIRATION DATE: NORMAL
WBC # BLD AUTO: 11.8 THOUSAND/UL (ref 4.31–10.16)

## 2024-02-24 PROCEDURE — 86900 BLOOD TYPING SEROLOGIC ABO: CPT

## 2024-02-24 PROCEDURE — 84156 ASSAY OF PROTEIN URINE: CPT

## 2024-02-24 PROCEDURE — 85027 COMPLETE CBC AUTOMATED: CPT

## 2024-02-24 PROCEDURE — 80053 COMPREHEN METABOLIC PANEL: CPT

## 2024-02-24 PROCEDURE — 86901 BLOOD TYPING SEROLOGIC RH(D): CPT

## 2024-02-24 PROCEDURE — NC001 PR NO CHARGE: Performed by: STUDENT IN AN ORGANIZED HEALTH CARE EDUCATION/TRAINING PROGRAM

## 2024-02-24 PROCEDURE — 10907ZC DRAINAGE OF AMNIOTIC FLUID, THERAPEUTIC FROM PRODUCTS OF CONCEPTION, VIA NATURAL OR ARTIFICIAL OPENING: ICD-10-PCS | Performed by: STUDENT IN AN ORGANIZED HEALTH CARE EDUCATION/TRAINING PROGRAM

## 2024-02-24 PROCEDURE — 86850 RBC ANTIBODY SCREEN: CPT

## 2024-02-24 PROCEDURE — 82570 ASSAY OF URINE CREATININE: CPT

## 2024-02-24 PROCEDURE — 86780 TREPONEMA PALLIDUM: CPT

## 2024-02-24 PROCEDURE — 99213 OFFICE O/P EST LOW 20 MIN: CPT

## 2024-02-24 PROCEDURE — 4A1HXCZ MONITORING OF PRODUCTS OF CONCEPTION, CARDIAC RATE, EXTERNAL APPROACH: ICD-10-PCS | Performed by: STUDENT IN AN ORGANIZED HEALTH CARE EDUCATION/TRAINING PROGRAM

## 2024-02-24 RX ORDER — SODIUM CHLORIDE, SODIUM LACTATE, POTASSIUM CHLORIDE, CALCIUM CHLORIDE 600; 310; 30; 20 MG/100ML; MG/100ML; MG/100ML; MG/100ML
125 INJECTION, SOLUTION INTRAVENOUS CONTINUOUS
Status: DISCONTINUED | OUTPATIENT
Start: 2024-02-24 | End: 2024-02-26 | Stop reason: HOSPADM

## 2024-02-24 RX ORDER — ONDANSETRON 2 MG/ML
4 INJECTION INTRAMUSCULAR; INTRAVENOUS EVERY 6 HOURS PRN
Status: DISCONTINUED | OUTPATIENT
Start: 2024-02-24 | End: 2024-02-25

## 2024-02-24 RX ORDER — OXYTOCIN/RINGER'S LACTATE 30/500 ML
PLASTIC BAG, INJECTION (ML) INTRAVENOUS
Status: COMPLETED
Start: 2024-02-24 | End: 2024-02-25

## 2024-02-24 RX ORDER — EPHEDRINE SULFATE 50 MG/ML
INJECTION INTRAVENOUS AS NEEDED
Status: DISCONTINUED | OUTPATIENT
Start: 2024-02-24 | End: 2024-02-26 | Stop reason: HOSPADM

## 2024-02-24 RX ORDER — BUPIVACAINE HYDROCHLORIDE 2.5 MG/ML
30 INJECTION, SOLUTION EPIDURAL; INFILTRATION; INTRACAUDAL ONCE AS NEEDED
Status: DISCONTINUED | OUTPATIENT
Start: 2024-02-24 | End: 2024-02-25

## 2024-02-24 RX ORDER — LIDOCAINE HYDROCHLORIDE AND EPINEPHRINE 15; 5 MG/ML; UG/ML
INJECTION, SOLUTION EPIDURAL
Status: COMPLETED | OUTPATIENT
Start: 2024-02-24 | End: 2024-02-24

## 2024-02-24 RX ADMIN — ROPIVACAINE HYDROCHLORIDE: 2 INJECTION, SOLUTION EPIDURAL; INFILTRATION at 21:17

## 2024-02-24 RX ADMIN — SODIUM CHLORIDE, SODIUM LACTATE, POTASSIUM CHLORIDE, AND CALCIUM CHLORIDE 125 ML/HR: .6; .31; .03; .02 INJECTION, SOLUTION INTRAVENOUS at 20:26

## 2024-02-24 RX ADMIN — SODIUM CHLORIDE, SODIUM LACTATE, POTASSIUM CHLORIDE, AND CALCIUM CHLORIDE 500 ML/HR: .6; .31; .03; .02 INJECTION, SOLUTION INTRAVENOUS at 18:33

## 2024-02-24 RX ADMIN — EPHEDRINE SULFATE 15 MG: 50 INJECTION INTRAVENOUS at 21:08

## 2024-02-24 RX ADMIN — LIDOCAINE HYDROCHLORIDE AND EPINEPHRINE 3 ML: 15; 5 INJECTION, SOLUTION EPIDURAL at 21:06

## 2024-02-24 NOTE — TELEPHONE ENCOUNTER
"Reason for Disposition  • [1] History of prior delivery (multipara) AND [2] contractions < 10 minutes apart AND [3] present 1 hour    Answer Assessment - Initial Assessment Questions  1. ONSET: \"When did the symptoms begin?\"         Contractions started last night     2. CONTRACTIONS: \"Describe the contractions that you are having.\" (e.g., duration, frequency, regularity, severity)      Every 5 minutes or less for last 30 minutes       6/10    3. ROSAURA: \"What date are you expecting to deliver?\"      39w4d    4. PARITY: \"Have you had a baby before?\" If Yes, ask: \"How long did the labor last?\"          5. FETAL MOVEMENT: \"Has the baby's movement decreased or changed significantly from normal?\"      Good fetal movement     6. OTHER SYMPTOMS: \"Do you have any other symptoms?\" (e.g., leaking fluid from vagina, vaginal bleeding, fever, hand/facial swelling)      Having some \"bloody show\"       Unsure if water is broken    Protocols used: Pregnancy - Labor-ADULT-AH    "

## 2024-02-24 NOTE — TELEPHONE ENCOUNTER
On call provider contacted, stated patient should be evaluated at [facility] at L&D triage. Patient made aware and verbalized understanding. L&D charge RN made aware of patient's pending arrival.

## 2024-02-24 NOTE — ASSESSMENT & PLAN NOTE
Lochia WNL   Recovering well   Appropriate bowel and bladder function   Pain well controlled   Tolerating diet   Breastfeeding: yes   Ambulating without issues   No lower extremity tenderness  GBS neg   Rh pos

## 2024-02-24 NOTE — H&P
H & P- Obstetrics   Sheila Young 34 y.o. female MRN: [id number]  Unit/Bed#: LD TRIAGE 2 Encounter: [id number]      Assessment/Plan:    Sheila is a 34 y.o.  at 39w4d admitted for labor    SVE: Cervical Dilation: 4  Cervical Effacement: 70  Cervical Consistency: Soft  Fetal Station: -2  Presentation: Vertex  Position: Unknown  Method: Manual  OB Examiner: Howie    Elevated blood pressure reading in office without diagnosis of hypertension  Assessment & Plan  Elevated BP on L&D  CBC, CMP UPC sent    * Normal labor  Assessment & Plan  Admit to OB/GYN service  Follow up CBC, RPR, Blood Type  EFW: 52th%  VTX by transabdominal ultrasound   GBS neg status  Analgesia and/or epidural at patient request  Anticipate             Patient of: [facility]   This patient will be an INPATIENT  and I certify the anticipated length of stay is >2 Midnights  Discussed with Dr. Bob      SUBJECTIVE:    Chief Complaint: Labor    HPI: Sheila Young is a 34 y.o.  with an ROSAURA of 2024, by Last Menstrual Period at 39w4d who is being admitted for labor . She complains of uterine contractions, occurring every 3-4 minutes, has no LOF, and reports no VB. She states she has felt good FM.. This pregnancy is complicated by obesity, elevated blood pressure reading without a diagnosis. All other review of systems is negative.       Pregnancy Plan:  Pregnancy: [place]  Fetal sex: Male     Delivery Plans  Planned delivery method: Vaginal      Patient Active Problem List   Diagnosis    BMI 30.0-30.9,adult    Seasonal allergies    Elevated blood pressure reading in office without diagnosis of hypertension    36 weeks gestation of pregnancy    Anemia affecting pregnancy in third trimester    Normal labor       OB History    Para Term  AB Living   2 1 1 0 0 1   SAB IAB Ectopic Multiple Live Births   0 0 0 0 1      # Outcome Date GA Lbr Aidan/2nd Weight Sex Delivery Anes PTL Lv   2 Current             1 Term 08/17/21 39w1d / 01:43 3335 g (7 lb 5.6 oz) F Vag-Spont EPI N ZAHRAA      Obstetric Comments   2023 Hgb electrophoresis WNL; varicella immune; CF neg; SMA pos -  negative       Past Medical History:   Diagnosis Date    Acne     Allergic     seasonal    Anemia affecting pregnancy in third trimester 01/22/2024    Elevated blood pressure reading in office with diagnosis of hypertension 12/11/2023    Elevated blood pressure reading in office without diagnosis of hypertension 12/11/2023    One elevated reading on 8/22 140/70.     Varicella     vaccinated       Past Surgical History:   Procedure Laterality Date    WISDOM TOOTH EXTRACTION      WISDOM TOOTH EXTRACTION         Social History     Tobacco Use    Smoking status: Never    Smokeless tobacco: Never   Substance Use Topics    Alcohol use: Not Currently     Comment: occasional       No Known Allergies    Medications Prior to Admission   Medication    Prenatal MV-Min-Fe Fum-FA-DHA (PRENATAL+DHA PO)    calcium carbonate (Tums) 500 mg chewable tablet    docusate sodium (COLACE) 100 mg capsule           OBJECTIVE:  Vitals:  Temp:  [98.8 °F (37.1 °C)] 98.8 °F (37.1 °C)  HR:  [] 87  Resp:  [18] 18  BP: (113-167)/(70-86) 113/70  Body mass index is 36.28 kg/m².     Physical Exam:  General: Well appearing, no distress  Respiratory: Unlabored breathing  Cardiovascular: Regular rate.  Abdomen: Soft, gravid, nontender  Fundal Height: Appropriate for gestational age.  Extremities: Warm and well perfused.  Non tender.  Psychiatric: Behavioral normal        FHT:  Baseline Rate (FHR): 140 bpm  Variability: Moderate  Accelerations: 15 x 15 or greater, At variable times  Decelerations: None    TOCO:   Contraction Frequency (minutes): 3-5  Contraction Duration (seconds): 60-90  Contraction Intensity: Moderate      Prenatal Labs: Blood Type:   Lab Results   Component Value Date/Time    ABO Grouping A 08/23/2023 12:19 PM    ABO Grouping A 08/16/2021 07:04 PM     , D  "(Rh type):   Lab Results   Component Value Date/Time    Rh Type RH(D) POSITIVE 08/23/2023 12:19 PM     , Antibody Screen: No results found for: \"ANTIBODYSCR\" , HCT/HGB:   Lab Results   Component Value Date/Time    Hematocrit 36.3 02/24/2024 06:30 PM    Hemoglobin 12.2 02/24/2024 06:30 PM      , Platelets:   Lab Results   Component Value Date/Time    Platelets 227 02/24/2024 06:30 PM      , 1 hour Glucola:   Lab Results   Component Value Date/Time    Glucose 96 12/01/2023 11:31 AM   , Varicella:   Lab Results   Component Value Date/Time    Varicella Zoster, IgG 1,890.00 08/23/2023 12:19 PM       , Rubella: No results found for: \"RUBELLAIGGQT\"     , VDRL/RPR:   Lab Results   Component Value Date/Time    RPR NON-REACTIVE 12/01/2023 11:31 AM    RPR Non-Reactive 08/16/2021 07:03 PM      , Urine Culture/Screen: No results found for: \"URINECX\"    , Hep B:   Lab Results   Component Value Date/Time    Hepatitis B Surface Ag Non-reactive 08/23/2023 12:00 AM     , Hep C: No components found for: \"HEPCSAG\", \"EXTHEPCSAG\"   , HIV:   Lab Results   Component Value Date/Time    HIV-1/HIV-2 AB Non-Reactive 08/23/2023 12:00 AM    HIV AG/AB, 4th Gen NON-REACTIVE 08/23/2023 12:19 PM     , Chlamydia: No results found for: \"EXTCHLAMYDIA\"  , Gonorrhea:   Lab Results   Component Value Date/Time    N gonorrhoeae, DNA Probe Negative 08/01/2023 04:08 PM     , Group B Strep:    Lab Results   Component Value Date/Time    Strep Grp B PCR Negative 01/30/2024 04:40 PM      ,       Rita Denise,   2/24/2024  6:59 PM        Portions of the record may have been created with voice recognition software.  Occasional wrong word or \"sound a like\" substitutions may have occurred due to the inherent limitations of voice recognition software.  Read the chart carefully and recognize, using context, where substitutions have occurred    "

## 2024-02-24 NOTE — ASSESSMENT & PLAN NOTE
Systolic (12hrs), Av , Min:105 , Max:112   Diastolic (12hrs), Av, Min:65, Max:71      CBC, CMP wnl, UPC wnl

## 2024-02-24 NOTE — TELEPHONE ENCOUNTER
"Regarding: contractions/40 weeks  ----- Message from Tatyana Valdovinos sent at 2/24/2024  4:12 PM EST -----  \"I am almost 40 weeks pregnant and I am having pretty regular contractions.\"    "

## 2024-02-25 LAB
BASE EXCESS BLDCOA CALC-SCNC: -9.8 MMOL/L (ref 3–11)
BASE EXCESS BLDCOV CALC-SCNC: -7 MMOL/L (ref 1–9)
HCO3 BLDCOA-SCNC: 19.4 MMOL/L (ref 17.3–27.3)
HCO3 BLDCOV-SCNC: 18.8 MMOL/L (ref 12.2–28.6)
O2 CT VFR BLDCOA CALC: 8.5 ML/DL
OXYHGB MFR BLDCOA: 42.4 %
OXYHGB MFR BLDCOV: 60.8 %
PCO2 BLDCOA: 56 MM[HG] (ref 30–60)
PCO2 BLDCOV: 38.9 MM HG (ref 27–43)
PH BLDCOA: 7.16 [PH] (ref 7.23–7.43)
PH BLDCOV: 7.3 [PH] (ref 7.19–7.49)
PO2 BLDCOA: 26.1 MM HG (ref 5–25)
PO2 BLDCOV: 29.9 MM HG (ref 15–45)
SAO2 % BLDCOV: 11.7 ML/DL
TREPONEMA PALLIDUM IGG+IGM AB [PRESENCE] IN SERUM OR PLASMA BY IMMUNOASSAY: NORMAL

## 2024-02-25 PROCEDURE — 59409 OBSTETRICAL CARE: CPT | Performed by: STUDENT IN AN ORGANIZED HEALTH CARE EDUCATION/TRAINING PROGRAM

## 2024-02-25 PROCEDURE — 82805 BLOOD GASES W/O2 SATURATION: CPT | Performed by: OBSTETRICS & GYNECOLOGY

## 2024-02-25 RX ORDER — BENZOCAINE/MENTHOL 6 MG-10 MG
1 LOZENGE MUCOUS MEMBRANE DAILY PRN
Status: DISCONTINUED | OUTPATIENT
Start: 2024-02-25 | End: 2024-02-26 | Stop reason: HOSPADM

## 2024-02-25 RX ORDER — IBUPROFEN 600 MG/1
600 TABLET ORAL EVERY 6 HOURS
Status: DISCONTINUED | OUTPATIENT
Start: 2024-02-25 | End: 2024-02-26 | Stop reason: HOSPADM

## 2024-02-25 RX ORDER — SENNOSIDES 8.6 MG
1 TABLET ORAL DAILY
Status: DISCONTINUED | OUTPATIENT
Start: 2024-02-25 | End: 2024-02-25

## 2024-02-25 RX ORDER — ACETAMINOPHEN 325 MG/1
650 TABLET ORAL EVERY 4 HOURS PRN
Status: DISCONTINUED | OUTPATIENT
Start: 2024-02-25 | End: 2024-02-26 | Stop reason: HOSPADM

## 2024-02-25 RX ORDER — CALCIUM CARBONATE 500 MG/1
1000 TABLET, CHEWABLE ORAL DAILY PRN
Status: DISCONTINUED | OUTPATIENT
Start: 2024-02-25 | End: 2024-02-26 | Stop reason: HOSPADM

## 2024-02-25 RX ORDER — DOCUSATE SODIUM 100 MG/1
100 CAPSULE, LIQUID FILLED ORAL 2 TIMES DAILY
Status: DISCONTINUED | OUTPATIENT
Start: 2024-02-25 | End: 2024-02-26 | Stop reason: HOSPADM

## 2024-02-25 RX ORDER — DIPHENHYDRAMINE HCL 25 MG
25 TABLET ORAL EVERY 6 HOURS PRN
Status: DISCONTINUED | OUTPATIENT
Start: 2024-02-25 | End: 2024-02-26 | Stop reason: HOSPADM

## 2024-02-25 RX ORDER — SIMETHICONE 80 MG
80 TABLET,CHEWABLE ORAL 4 TIMES DAILY PRN
Status: DISCONTINUED | OUTPATIENT
Start: 2024-02-25 | End: 2024-02-26 | Stop reason: HOSPADM

## 2024-02-25 RX ORDER — ONDANSETRON 2 MG/ML
4 INJECTION INTRAMUSCULAR; INTRAVENOUS EVERY 8 HOURS PRN
Status: DISCONTINUED | OUTPATIENT
Start: 2024-02-25 | End: 2024-02-26 | Stop reason: HOSPADM

## 2024-02-25 RX ORDER — BUPIVACAINE HYDROCHLORIDE 2.5 MG/ML
INJECTION, SOLUTION EPIDURAL; INFILTRATION; INTRACAUDAL AS NEEDED
Status: DISCONTINUED | OUTPATIENT
Start: 2024-02-25 | End: 2024-02-26 | Stop reason: HOSPADM

## 2024-02-25 RX ORDER — FENTANYL CITRATE 50 UG/ML
INJECTION, SOLUTION INTRAMUSCULAR; INTRAVENOUS AS NEEDED
Status: DISCONTINUED | OUTPATIENT
Start: 2024-02-25 | End: 2024-02-25

## 2024-02-25 RX ORDER — FENTANYL CITRATE 50 UG/ML
INJECTION, SOLUTION INTRAMUSCULAR; INTRAVENOUS AS NEEDED
Status: DISCONTINUED | OUTPATIENT
Start: 2024-02-25 | End: 2024-02-26 | Stop reason: HOSPADM

## 2024-02-25 RX ADMIN — ROPIVACAINE HYDROCHLORIDE: 2 INJECTION, SOLUTION EPIDURAL; INFILTRATION at 02:42

## 2024-02-25 RX ADMIN — IBUPROFEN 600 MG: 600 TABLET ORAL at 21:33

## 2024-02-25 RX ADMIN — BENZOCAINE AND LEVOMENTHOL 1 APPLICATION: 200; 5 SPRAY TOPICAL at 06:43

## 2024-02-25 RX ADMIN — IBUPROFEN 600 MG: 600 TABLET ORAL at 15:36

## 2024-02-25 RX ADMIN — Medication 30 UNITS: at 05:40

## 2024-02-25 RX ADMIN — DOCUSATE SODIUM 100 MG: 100 CAPSULE, LIQUID FILLED ORAL at 09:47

## 2024-02-25 RX ADMIN — IBUPROFEN 600 MG: 600 TABLET ORAL at 06:43

## 2024-02-25 RX ADMIN — BUPIVACAINE HYDROCHLORIDE 5 ML: 2.5 INJECTION, SOLUTION EPIDURAL; INFILTRATION; INTRACAUDAL; PERINEURAL at 00:06

## 2024-02-25 RX ADMIN — SODIUM CHLORIDE, SODIUM LACTATE, POTASSIUM CHLORIDE, AND CALCIUM CHLORIDE 125 ML/HR: .6; .31; .03; .02 INJECTION, SOLUTION INTRAVENOUS at 01:18

## 2024-02-25 RX ADMIN — HYDROCORTISONE 1 APPLICATION: 1 CREAM TOPICAL at 06:43

## 2024-02-25 RX ADMIN — EPHEDRINE SULFATE 10 MG: 50 INJECTION INTRAVENOUS at 00:06

## 2024-02-25 RX ADMIN — DOCUSATE SODIUM 100 MG: 100 CAPSULE, LIQUID FILLED ORAL at 17:02

## 2024-02-25 RX ADMIN — WITCH HAZEL 1 PAD: 500 SOLUTION RECTAL; TOPICAL at 06:43

## 2024-02-25 RX ADMIN — FENTANYL CITRATE 100 MCG: 50 INJECTION INTRAMUSCULAR; INTRAVENOUS at 00:06

## 2024-02-25 NOTE — OB LABOR/OXYTOCIN SAFETY PROGRESS
Labor Progress Note - Sheila Young 34 y.o. female MRN: [id number]    Unit/Bed#: -01 Encounter: [id number]       Contraction Frequency (minutes): 2-3  Contraction Intensity: Moderate  Uterine Activity Characteristics: Regular  Cervical Dilation: 9        Cervical Effacement: 90  Fetal Station: 0  Baseline Rate (FHR): 135 bpm  Fetal Heart Rate (FHT): 132 BPM  FHR Category: cat 1  Oxytocin Safety Progress Check: Safety check completed            Vital Signs:   Vitals:    24 0110   BP: (!) 88/54   Pulse: (!) 116   Resp:    Temp:    SpO2:        Notes/comments:      as above. Continue to anticipate     Rita Denise DO 2024 2:40 AM

## 2024-02-25 NOTE — ANESTHESIA PROCEDURE NOTES
Epidural Block    Patient location during procedure: OB/L&D  Start time: 2/24/2024 9:05 PM  Reason for block: procedure for pain  Staffing  Performed by: Aura Isaac CRNA  Authorized by: Carlos Hampton MD    Preanesthetic Checklist  Completed: patient identified, IV checked, site marked, risks and benefits discussed, surgical consent, monitors and equipment checked, pre-op evaluation and timeout performed  Epidural  Patient position: sitting  Prep: ChloraPrep  Sedation Level: no sedation  Patient monitoring: frequent blood pressure checks, continuous pulse oximetry and heart rate  Approach: midline  Location: lumbar, L3-4  Injection technique: ARIS saline  Needle  Needle type: Tuohy   Needle gauge: 18 G  Needle insertion depth: 7.5 cm  Catheter type: multi-orifice  Catheter size: 20 G  Catheter at skin depth: 14 cm  Catheter securement method: stabilization device, clear occlusive dressing and tape  Test dose: negativelidocaine-epinephrine (XYLOCAINE-MPF/EPINEPHRINE) 1.5 %-1:200,000 injection 3 mL - Epidural   3 mL - 2/24/2024 9:06:00 PM  Assessment  Sensory level: T10  Number of attempts: 1negative aspiration for CSF, negative aspiration for heme and no paresthesia on injection  patient tolerated the procedure well with no immediate complications

## 2024-02-25 NOTE — L&D DELIVERY NOTE
Vaginal Delivery Summary - OB/GYN   Sheila Young 34 y.o. female MRN: [id number]  Unit/Bed#:  204-01 Encounter: [id number]    Pre-delivery Diagnosis:   Pregnancy at 39.5wks   Gestational HTN    Post-delivery Diagnosis: same, delivered    Procedure: Spontaneous Vaginal Delivery     OBGYN Practice: [facility]     Attending Physician: Dr. Bob  Resident Physician: none  Other Assistant: none    Anesthesia: Epidural ,     QBL:      pending    Complications: none apparent    Specimens:   1. Arterial and venous cord gases  2. Cord blood  3. Segment of umbilical cord  4. Placenta to storage     Findings:  1. Viable male  on 2024  5:39 AM  via Vaginal, Spontaneous  . with APGAR scores of 9  and 9  at 1 and 5 minutes, respectively. Weight pending at time of dictation for skin to skin bonding.  2. Spontaneous delivery of intact placenta      Gases:  Umbilical Artery  Recent Labs     24  0544   PHCART 7.157*   BECART -9.8*       Umbilical Vein  Recent Labs     24  0544   PHCVEN 7.303   BECVEN -7.0*           Brief history and labor course:  Sheila Young is a 34 y.o. [id number] 39w5d . She presented to labor and delivery for labor. Her pregnancy was complicated by gestational HTN diagnosed intrapartum. On exam in triage she was noted to be . She was admitted for . She was augmented with amniotomy and pitocin.    Description of delivery:    After pushing, Sheila delivered a viable male , wt pending as mother is doing skin to skin bonding. The fetal vertex delivered OA position spontaneously. There was no nuchal cord. The anterior shoulder delivered atraumatically with maternal expulsive forces and the assistance of gentle downward traction. The posterior shoulder delivered with maternal expulsive forces and the assistance of gentle upward traction. The remainder of the fetus delivered spontaneously.      Upon delivery, the infant was placed on Sheila's abdomen and  the cord was doubly clamped and cut. Delayed cord clamping was achieved. The infant was noted to cry spontaneously and was moving all extremities appropriately. There was no evidence for injury. Awaiting nurse resuscitators evaluated the . Arterial and venous cord blood gases and cord blood was collected for analysis. These were promptly sent to the lab. In the immediate post-partum, active management of the 3rd stage of labor was performed with massage, the administration of 30 units of IV pitocin, and gentle traction on the umbilical cord. The placenta delivered spontaneously and was noted to have a centrally inserted 3 vessel cord.  The placenta was sent to      The vagina, cervix, perineum, and rectum were inspected and was note to be intact.    At the conclusion of the procedure, all needle, sponge, and instrument counts were noted to be correct.     Disposition:  The patient and the  both tolerated the procedure well and are recovering in labor and delivery room       Valorie Bob DO  2024  6:01 AM

## 2024-02-25 NOTE — PLAN OF CARE
Problem: PAIN - ADULT  Goal: Verbalizes/displays adequate comfort level or baseline comfort level  Description: Interventions:  - Encourage patient to monitor pain and request assistance  - Assess pain using appropriate pain scale  - Administer analgesics based on type and severity of pain and evaluate response  - Implement non-pharmacological measures as appropriate and evaluate response  - Consider cultural and social influences on pain and pain management  - Notify physician/advanced practitioner if interventions unsuccessful or patient reports new pain  Outcome: Progressing     Problem: INFECTION - ADULT  Goal: Absence or prevention of progression during hospitalization  Description: INTERVENTIONS:  - Assess and monitor for signs and symptoms of infection  - Monitor lab/diagnostic results  - Monitor all insertion sites, i.e. indwelling lines, tubes, and drains  - Monitor endotracheal if appropriate and nasal secretions for changes in amount and color  - [place] appropriate cooling/warming therapies per order  - Administer medications as ordered  - Instruct and encourage patient and family to use good hand hygiene technique  - Identify and instruct in appropriate isolation precautions for identified infection/condition  Outcome: Progressing  Goal: Absence of fever/infection during neutropenic period  Description: INTERVENTIONS:  - Monitor WBC    Outcome: Progressing     Problem: SAFETY ADULT  Goal: Patient will remain free of falls  Description: INTERVENTIONS:  - Educate patient/family on patient safety including physical limitations  - Instruct patient to call for assistance with activity   - Consult OT/PT to assist with strengthening/mobility   - Keep Call bell within reach  - Keep bed low and locked with side rails adjusted as appropriate  - Keep care items and personal belongings within reach  - Initiate and maintain comfort rounds  - Make Fall Risk Sign visible to staff  - Apply yellow socks and bracelet  for high fall risk patients  - Consider moving patient to room near nurses station  Outcome: Progressing  Goal: Maintain or return to baseline ADL function  Description: INTERVENTIONS:  -  Assess patient's ability to carry out ADLs; assess patient's baseline for ADL function and identify physical deficits which impact ability to perform ADLs (bathing, care of mouth/teeth, toileting, grooming, dressing, etc.)  - Assess/evaluate cause of self-care deficits   - Assess range of motion  - Assess patient's mobility; develop plan if impaired  - Assess patient's need for assistive devices and provide as appropriate  - Encourage maximum independence but intervene and supervise when necessary  - Involve family in performance of ADLs  - Assess for home care needs following discharge   - Consider OT consult to assist with ADL evaluation and planning for discharge  - Provide patient education as appropriate  Outcome: Progressing  Goal: Maintains/Returns to pre admission functional level  Description: INTERVENTIONS:  - Perform AM-PAC 6 Click Basic Mobility/ Daily Activity assessment daily.  - Set and communicate daily mobility goal to care team and patient/family/caregiver.   - Collaborate with rehabilitation services on mobility goals if consulted  - Out of bed for toileting  - Record patient progress and toleration of activity level   Outcome: Progressing     Problem: DISCHARGE PLANNING  Goal: Discharge to home or other facility with appropriate resources  Description: INTERVENTIONS:  - Identify barriers to discharge w/patient and caregiver  - Arrange for needed discharge resources and transportation as appropriate  - Identify discharge learning needs (meds, wound care, etc.)  - Arrange for interpretive services to assist at discharge as needed  - Refer to Case Management Department for coordinating discharge planning if the patient needs post-hospital services based on physician/advanced practitioner order or complex needs  related to functional status, cognitive ability, or social support system  Outcome: Progressing

## 2024-02-25 NOTE — OB LABOR/OXYTOCIN SAFETY PROGRESS
Labor Progress Note - Sheila Young 34 y.o. female MRN: [id number]    Unit/Bed#: -01 Encounter: [id number]       Contraction Frequency (minutes): 3-5  Contraction Intensity: Mild/Moderate  Uterine Activity Characteristics: Regular  Cervical Dilation: 4        Cervical Effacement: 70  Fetal Station: -2  Baseline Rate (FHR): 135 bpm  Fetal Heart Rate (FHT): 135 BPM  FHR Category: 1  Oxytocin Safety Progress Check: Safety check completed            Vital Signs:   Vitals:    02/24/24 2124   BP: 112/64   Pulse: 90   Resp:    Temp:    SpO2:        Notes/comments:   -AROM Clear   -consider pitocin if contractions space out   -recheck matthew Bob DO 2/24/2024 10:09 PM

## 2024-02-25 NOTE — PLAN OF CARE
Problem: PAIN - ADULT  Goal: Verbalizes/displays adequate comfort level or baseline comfort level  Description: Interventions:  - Encourage patient to monitor pain and request assistance  - Assess pain using appropriate pain scale  - Administer analgesics based on type and severity of pain and evaluate response  - Implement non-pharmacological measures as appropriate and evaluate response  - Consider cultural and social influences on pain and pain management  - Notify physician/advanced practitioner if interventions unsuccessful or patient reports new pain  Outcome: Progressing     Problem: INFECTION - ADULT  Goal: Absence or prevention of progression during hospitalization  Description: INTERVENTIONS:  - Assess and monitor for signs and symptoms of infection  - Monitor lab/diagnostic results  - Monitor all insertion sites, i.e. indwelling lines, tubes, and drains  - Monitor endotracheal if appropriate and nasal secretions for changes in amount and color  - [place] appropriate cooling/warming therapies per order  - Administer medications as ordered  - Instruct and encourage patient and family to use good hand hygiene technique  - Identify and instruct in appropriate isolation precautions for identified infection/condition  Outcome: Progressing  Goal: Absence of fever/infection during neutropenic period  Description: INTERVENTIONS:  - Monitor WBC    Outcome: Progressing     Problem: SAFETY ADULT  Goal: Patient will remain free of falls  Description: INTERVENTIONS:  - Educate patient/family on patient safety including physical limitations  - Instruct patient to call for assistance with activity   - Consult OT/PT to assist with strengthening/mobility   - Keep Call bell within reach  - Keep bed low and locked with side rails adjusted as appropriate  - Keep care items and personal belongings within reach  - Initiate and maintain comfort rounds  - Make Fall Risk Sign visible to staff  - Apply yellow socks and bracelet  for high fall risk patients  - Consider moving patient to room near nurses station  Outcome: Progressing  Goal: Maintain or return to baseline ADL function  Description: INTERVENTIONS:  -  Assess patient's ability to carry out ADLs; assess patient's baseline for ADL function and identify physical deficits which impact ability to perform ADLs (bathing, care of mouth/teeth, toileting, grooming, dressing, etc.)  - Assess/evaluate cause of self-care deficits   - Assess range of motion  - Assess patient's mobility; develop plan if impaired  - Assess patient's need for assistive devices and provide as appropriate  - Encourage maximum independence but intervene and supervise when necessary  - Involve family in performance of ADLs  - Assess for home care needs following discharge   - Consider OT consult to assist with ADL evaluation and planning for discharge  - Provide patient education as appropriate  Outcome: Progressing  Goal: Maintains/Returns to pre admission functional level  Description: INTERVENTIONS:  - Perform AM-PAC 6 Click Basic Mobility/ Daily Activity assessment daily.  - Set and communicate daily mobility goal to care team and patient/family/caregiver.   - Collaborate with rehabilitation services on mobility goals if consulted  - Out of bed for toileting  - Record patient progress and toleration of activity level   Outcome: Progressing     Problem: Knowledge Deficit  Goal: Patient/family/caregiver demonstrates understanding of disease process, treatment plan, medications, and discharge instructions  Description: Complete learning assessment and assess knowledge base.  Interventions:  - Provide teaching at level of understanding  - Provide teaching via preferred learning methods  Outcome: Progressing  Goal: Verbalizes understanding of labor plan  Description: Assess patient/family/caregiver's baseline knowledge level and ability to understand information.  Provide education via patient/family/caregiver's  preferred learning method at appropriate level of understanding.     1. Provide teaching at level of understanding.  2. Provide teaching via preferred learning method(s).  Outcome: Progressing     Problem: DISCHARGE PLANNING  Goal: Discharge to home or other facility with appropriate resources  Description: INTERVENTIONS:  - Identify barriers to discharge w/patient and caregiver  - Arrange for needed discharge resources and transportation as appropriate  - Identify discharge learning needs (meds, wound care, etc.)  - Arrange for interpretive services to assist at discharge as needed  - Refer to Case Management Department for coordinating discharge planning if the patient needs post-hospital services based on physician/advanced practitioner order or complex needs related to functional status, cognitive ability, or social support system  Outcome: Progressing     Problem: Labor & Delivery  Goal: Manages discomfort  Description: Assess and monitor for signs and symptoms of discomfort.  Assess patient's pain level regularly and per hospital policy.  Administer medications as ordered. Support use of nonpharmacological methods to help control pain such as distraction, imagery, relaxation, and application of heat and cold.  Collaborate with interdisciplinary team and patient to determine appropriate pain management plan.    1. Include patient in decisions related to comfort.  2. Offer non-pharmacological pain management interventions.  3. Report ineffective pain management to physician.  Outcome: Progressing  Goal: Patient vital signs are stable  Description: 1. Assess vital signs - vaginal delivery.  Outcome: Progressing

## 2024-02-25 NOTE — LACTATION NOTE
This note was copied from a baby's chart.  CONSULT - LACTATION  Baby Boy Young (Barbara) 0 days male MRN: [id number]    [facility] NURSERY Room / Bed: (N)/(N) Encounter: [id number]    Maternal Information     MOTHER:  Sheila Young  Maternal Age: 34 y.o.   OB History: # 1 - Date: 21, Sex: Female, Weight: 3335 g (7 lb 5.6 oz), GA: 39w1d, Delivery: Vaginal, Spontaneous, Apgar1: 9, Apgar5: 9, Living: Living, Birth Comments: None    # 2 - Date: 24, Sex: Male, Weight: 4195 g (9 lb 4 oz), GA: 39w5d, Delivery: Vaginal, Spontaneous, Apgar1: 9, Apgar5: 9, Living: Living, Birth Comments: None   Previouse breast reduction surgery? No    Lactation history:   Has patient previously breast fed:     How long had patient previously breast fed:     Previous breast feeding complications:       Past Surgical History:   Procedure Laterality Date    WISDOM TOOTH EXTRACTION      WISDOM TOOTH EXTRACTION          Birth information:  YOB: 2022   Time of birth: 5:39 AM   Sex: male   Delivery type: Vaginal, Spontaneous   Birth Weight: 4195 g (9 lb 4 oz)   Percent of Weight Change: 0%     Gestational Age: 39w5d   [unfilled]    Assessment     Breast and nipple assessment:  no clinical assessment     Assessment:  no clinical assessment    Feeding assessment: feeding well  LATCH:  Latch: Grasps breast, tongue down, lips flanged, rhythmic sucking   Audible Swallowing: A few with stimulation   Type of Nipple: Everted (After stimulation)   Comfort (Breast/Nipple): Soft/non-tender   Hold (Positioning): Partial assist, teach one side, mother does other, staff holds   LATCH Score: 8          Feeding recommendations:  breast feed on demand. Mom is an exp. Breastfeeding mom that breast fed her last child for over 12 months and then night fed a the breast until 2.     Mom requested verbal review of  how to align baby and snug hold at the breast.     Enc. Both  "breasts at every feeding    RSB/dC reviewed    Mom has s1 pump    Spectra Education on turning on the pump, press the 3 wavy lines to place pump on stimulation mode (high cycle, low vacuum) Set vacuum to comfort with light suction. After 3 min, press 3 wavy lines and change setting to Expression mode (low Cycle, High vacuum) Vacuum setting should not pinch, only tug the nipple. Now pump is set. Next time mom pumps, will only need to turn on pump and press 3 wavy line button to change cycle three times in a pumping session.     Education on creating a snug hold of your infant to the breast by verifying the infant's cheek is touching the breast, your infant's chin is deep into the breast tissue, your infant's arms are \"hugging\" the breast, and your infant's lips are flanged on the areola. Bring infant to the breast, not your breast to the infant. Latch should feel like a tugging sensation on the nipple.    Education on positioning and alignment. Mom is encouraged to:     - Bring baby up to the breast (use of pillows to elevate so baby's torso is against mom's breasts)   - Skin to skin for feedings with top hand exposed to show signs of satiation   - Chin deep into breast tissue (make baby look up to the nipple)   - nose aligned to the nipple   -Wait for wide gape, drag chin on the breast so nipple is aimed at the upper, back palate  - Cheek should be touching breast   - Deep, firm hold of baby with ear, shoulder, hip alignment    Information on hand expression given. Discussed benefits of knowing how to manually express breast including stimulating milk supply, softening nipple for latch and evacuating breast in the event of engorgement.    Mom is encouraged to place baby skin to skin for feedings. Skin to skin education provided for baby placement on mother's chest, baby only in diaper, blankets below shoulders on baby's back. Skin to skin is encouraged to continue at home for feedings and between feedings.    Worked " on positioning infant up at chest level and starting to feed infant with nose arriving at the nipple. Then, using areolar compression to achieve a deep latch that is comfortable and exchanges optimum amounts of milk.     - Start feedings on breast that last feeding ended   - allow no more than 3 hours between breast feeding sessions   - time between feedings is counted from the beginning of the first feed to the beginning of the next feeding session    Reviewed early signs of hunger, including tensing of hands and shoulders - no need to wait for open eyes.  Crying is a late hunger sign.  If baby is crying, soothe baby first and then attempt to latch.  Reviewed normal sucking patterns: transition from stimulation to nutritive to release or non-nutritive. The goal is to see and hear lots of swallowing.    Reviewed normal nursing pattern: infant could latch on one breast up to 30 minutes or until releases on own. Signs of satiation is open hand with fingers that do not grab your finger.  Discussed difference in sensation of non-nutritive v nutritive sucking    Met with mother. Provided mother with Ready, Set, Baby booklet.    Discussed Skin to Skin contact an benefits to mom and baby.  Talked about the delay of the first bath until baby has adjusted. Spoke about the benefits of rooming in. Feeding on cue and what that means for recognizing infant's hunger. Avoidance of pacifiers for the first month discussed. Talked about exclusive breastfeeding for the first 6 months.    Positioning and latch reviewed as well as showing images of other feeding positions.  Discussed the properties of a good latch in any position. Reviewed hand/manual expression.  Discussed s/s that baby is getting enough milk and some s/s that breastfeeding dyad may need further help.    Gave information on common concerns, what to expect the first few weeks after delivery, preparing for other caregivers, and how partners can help. Resources for  support also provided.    Encouraged parents to call for assistance, questions, and concerns about breastfeeding.  Extension provided.    Provided education on growth spurts, when to introduce bottles; paced bottle feeding, and non-nutritive suck at the breast. Provided education on Signs of satiation. Encouraged to call lactation to observe a latch prior to discharge for reassurance. Encouraged to call baby and me with any questions and closely monitor output.      Christy Hume, MA 2/25/2024 3:21 PM

## 2024-02-25 NOTE — DISCHARGE SUMMARY
Discharge Summary - Sheila Young 34 y.o. female MRN: [id number]    Unit/Bed#: -01 Encounter: [id number]    Admission Date: 2024     Discharge Date: 24    Patient Active Problem List   Diagnosis    BMI 30.0-30.9,adult    Seasonal allergies    Elevated blood pressure reading in office without diagnosis of hypertension    36 weeks gestation of pregnancy    Anemia affecting pregnancy in third trimester    Normal labor       OBGYN Practice: [facility]     Hospital Course:   Sheila Young is a 34 y.o.  who was admitted at 39w5d for labor. She came into triage and was 4/70/-2. She was AROMed for clear fluid. She progressed to complete and began pushing.       Delivery Findings:  Sheila delivered a viable male  on 2024  5:39 AM  via   . The delivery was uncomplicated.    Baby's Weight:  ;      Apgar scores:   and   at 1 and 5 minutes, respectively  Anesthesia:  ,   QBL:           was transferred to  nursery. Patient tolerated the procedure well and was transferred to recovery in stable condition.     Her post-partum course was uncomplicated.  Her post-partum pain was well controlled with oral analgesics. On day of discharge she was ambulating, voiding spontaneously, tolerating oral intake and hemodynamically stable. Mom's blood type is A positive and  Rhogam was not given.    She was discharged home on postpartum day #1 without complications. Patient was instructed to follow up with her OB as an outpatient and was given appropriate warnings to call doctor or provider if she develops signs of infection or uncontrolled pain.    Discharge Problem List by Issue:   Elevated blood pressure reading in office without diagnosis of hypertension  Assessment & Plan  Elevated BP on L&D  CBC, CMP UPC sent    * Normal labor  Assessment & Plan  Admit to OB/GYN service  Follow up CBC, RPR, Blood Type  EFW: 52th%  VTX by transabdominal ultrasound   GBS neg  status  Analgesia and/or epidural at patient request  Anticipate           Disposition: Home    Planned Readmission: No    Discharge Medications:   Please see AVS    Discharge instructions :   -Do not place anything (no partner, tampons or douche) in your vagina for 6 weeks.  -You may walk for exercise for the first 6 weeks then gradually return to your usual activities.   -Please do not drive for 1 week if you have no stitches and for 2 weeks if you have stitches or underwent a  delivery.    -You may take baths or shower per your preference.   -Please look at your bust (breasts) in the mirror daily and call your doctor for redness or tenderness or increased warmth.   - If you have had a  section please look at your incision daily as well and call provider for increasing redness or steady drainage from the incision.   -Please call your doctor's office if temperature > 100.4*F or 38* C, worsening pain or a foul discharge.    Follow Up:  - Follow up in 1 weeks for postpartum visit    Allie Brooks  OBGYN PGY2

## 2024-02-25 NOTE — OB LABOR/OXYTOCIN SAFETY PROGRESS
Labor Progress Note - Sheila Young 34 y.o. female MRN: [id number]    Unit/Bed#: -01 Encounter: [id number]       Contraction Frequency (minutes): 2-4  Contraction Intensity: Moderate  Uterine Activity Characteristics: Regular  Cervical Dilation: 6        Cervical Effacement: 80  Fetal Station: -1  Baseline Rate (FHR): 140 bpm  Fetal Heart Rate (FHT): 140 BPM  FHR Category: cat 1  Oxytocin Safety Progress Check: Safety check completed            Vital Signs:   Vitals:    24 2324   BP: 99/50   Pulse: (!) 116   Resp:    Temp:    SpO2:        Notes/comments:     Patient checked due to increased pressure, noted to make change as above. Continue to anticipate     Rita Denise DO 2024 11:47 PM       Detail Level: Simple

## 2024-02-25 NOTE — ANESTHESIA PREPROCEDURE EVALUATION
Procedure:  LABOR ANALGESIA    Relevant Problems   GYN   (+) 36 weeks gestation of pregnancy      HEMATOLOGY   (+) Anemia affecting pregnancy in third trimester        Physical Exam    Airway    Mallampati score: II  TM Distance: >3 FB  Neck ROM: full     Dental   No notable dental hx     Cardiovascular  Cardiovascular exam normal    Pulmonary  Pulmonary exam normal     Other Findings  post-pubertal.      Anesthesia Plan  ASA Score- 2     Anesthesia Type- epidural with ASA Monitors.         Additional Monitors:     Airway Plan:            Plan Factors-Exercise tolerance (METS): >4 METS.    Chart reviewed.   Existing labs reviewed.     Patient is not a current smoker. Patient not instructed to abstain from smoking on day of procedure. Patient did not smoke on day of surgery.            Induction- intravenous.    Postoperative Plan-     Informed Consent- Anesthetic plan and risks discussed with patient.  I personally reviewed this patient with the CRNA. Discussed and agreed on the Anesthesia Plan with the CRNA..

## 2024-02-26 VITALS
HEART RATE: 74 BPM | SYSTOLIC BLOOD PRESSURE: 112 MMHG | RESPIRATION RATE: 19 BRPM | TEMPERATURE: 97.9 F | BODY MASS INDEX: 36.32 KG/M2 | WEIGHT: 218 LBS | HEIGHT: 65 IN | OXYGEN SATURATION: 100 % | DIASTOLIC BLOOD PRESSURE: 73 MMHG

## 2024-02-26 PROCEDURE — NC001 PR NO CHARGE: Performed by: STUDENT IN AN ORGANIZED HEALTH CARE EDUCATION/TRAINING PROGRAM

## 2024-02-26 PROCEDURE — 99024 POSTOP FOLLOW-UP VISIT: CPT | Performed by: STUDENT IN AN ORGANIZED HEALTH CARE EDUCATION/TRAINING PROGRAM

## 2024-02-26 RX ORDER — IBUPROFEN 600 MG/1
600 TABLET ORAL EVERY 6 HOURS
Start: 2024-02-26

## 2024-02-26 RX ADMIN — IBUPROFEN 600 MG: 600 TABLET ORAL at 05:29

## 2024-02-26 RX ADMIN — IBUPROFEN 600 MG: 600 TABLET ORAL at 12:54

## 2024-02-26 RX ADMIN — DOCUSATE SODIUM 100 MG: 100 CAPSULE, LIQUID FILLED ORAL at 10:00

## 2024-02-26 NOTE — PLAN OF CARE
Problem: PAIN - ADULT  Goal: Verbalizes/displays adequate comfort level or baseline comfort level  Description: Interventions:  - Encourage patient to monitor pain and request assistance  - Assess pain using appropriate pain scale  - Administer analgesics based on type and severity of pain and evaluate response  - Implement non-pharmacological measures as appropriate and evaluate response  - Consider cultural and social influences on pain and pain management  - Notify physician/advanced practitioner if interventions unsuccessful or patient reports new pain  Outcome: Progressing     Problem: INFECTION - ADULT  Goal: Absence or prevention of progression during hospitalization  Description: INTERVENTIONS:  - Assess and monitor for signs and symptoms of infection  - Monitor lab/diagnostic results  - Monitor all insertion sites, i.e. indwelling lines, tubes, and drains  - Monitor endotracheal if appropriate and nasal secretions for changes in amount and color  - Scroggins appropriate cooling/warming therapies per order  - Administer medications as ordered  - Instruct and encourage patient and family to use good hand hygiene technique  - Identify and instruct in appropriate isolation precautions for identified infection/condition  Outcome: Progressing  Goal: Absence of fever/infection during neutropenic period  Description: INTERVENTIONS:  - Monitor WBC    Outcome: Progressing     Problem: SAFETY ADULT  Goal: Patient will remain free of falls  Description: INTERVENTIONS:  - Educate patient/family on patient safety including physical limitations  - Instruct patient to call for assistance with activity   - Consult OT/PT to assist with strengthening/mobility   - Keep Call bell within reach  - Keep bed low and locked with side rails adjusted as appropriate  - Keep care items and personal belongings within reach  - Initiate and maintain comfort rounds  - Make Fall Risk Sign visible to staff  - Apply yellow socks and bracelet  for high fall risk patients  - Consider moving patient to room near nurses station  Outcome: Progressing  Goal: Maintain or return to baseline ADL function  Description: INTERVENTIONS:  -  Assess patient's ability to carry out ADLs; assess patient's baseline for ADL function and identify physical deficits which impact ability to perform ADLs (bathing, care of mouth/teeth, toileting, grooming, dressing, etc.)  - Assess/evaluate cause of self-care deficits   - Assess range of motion  - Assess patient's mobility; develop plan if impaired  - Assess patient's need for assistive devices and provide as appropriate  - Encourage maximum independence but intervene and supervise when necessary  - Involve family in performance of ADLs  - Assess for home care needs following discharge   - Consider OT consult to assist with ADL evaluation and planning for discharge  - Provide patient education as appropriate  Outcome: Progressing  Goal: Maintains/Returns to pre admission functional level  Description: INTERVENTIONS:  - Perform AM-PAC 6 Click Basic Mobility/ Daily Activity assessment daily.  - Set and communicate daily mobility goal to care team and patient/family/caregiver.   - Collaborate with rehabilitation services on mobility goals if consulted  - Out of bed for toileting  - Record patient progress and toleration of activity level   Outcome: Progressing     Problem: DISCHARGE PLANNING  Goal: Discharge to home or other facility with appropriate resources  Description: INTERVENTIONS:  - Identify barriers to discharge w/patient and caregiver  - Arrange for needed discharge resources and transportation as appropriate  - Identify discharge learning needs (meds, wound care, etc.)  - Arrange for interpretive services to assist at discharge as needed  - Refer to Case Management Department for coordinating discharge planning if the patient needs post-hospital services based on physician/advanced practitioner order or complex needs  related to functional status, cognitive ability, or social support system  Outcome: Progressing

## 2024-02-26 NOTE — PROGRESS NOTES
"Progress Note - OB/GYN  Sheila Young 34 y.o. female MRN: [id number]  Unit/Bed#:  313-01 Encounter: [id number]    Assessment and Plan     Sheila Young is a patient of: [facility] . She is PPD# 1 s/p  spontaneous vaginal delivery  Recovering well and is stable       Elevated blood pressure reading in office without diagnosis of hypertension  Assessment & Plan  Systolic (12hrs), Av , Min:105 , Max:112   Diastolic (12hrs), Av, Min:65, Max:71      CBC, CMP wnl, UPC wnl    *  (spontaneous vaginal delivery)  Assessment & Plan  Lochia WNL   Recovering well   Appropriate bowel and bladder function   Pain well controlled   Tolerating diet   Breastfeeding: yes   Ambulating without issues   No lower extremity tenderness  GBS neg   Rh pos           Disposition    - Anticipate discharge home on PPD# 1-2      Subjective/Objective     Chief Complaint: Postpartum State     Subjective:    Sheila Young is PPD/POD#1 s/p  spontaneous vaginal delivery. She has no current complaints.  Pain is well controlled.  Patient is currently voiding.  She is ambulating.  Patient is currently passing flatus and has had no bowel movement. She is tolerating PO, and denies nausea or vomitting. Patient denies fever, chills, chest pain, shortness of breath, or calf tenderness. Lochia is minimal. She is  Breastfeeding. She is recovering well and is stable.       Vitals:   /70   Pulse 75   Temp 98.2 °F (36.8 °C) (Oral)   Resp 18   Ht 5' 5\" (1.651 m)   Wt 98.9 kg (218 lb)   LMP 2023   SpO2 99%   Breastfeeding Yes   BMI 36.28 kg/m²       Intake/Output Summary (Last 24 hours) at 2024 0634  Last data filed at 2024 0945  Gross per 24 hour   Intake --   Output 1150 ml   Net -1150 ml       Invasive Devices       None                   Physical Exam:   GEN: Sheila Young appears well, alert and oriented x 3, pleasant and cooperative   CARDIO: RRR, no murmurs or rubs  RESP:  CTAB, no " wheezes or rales  ABDOMEN: soft, no tenderness, no distention, fundus @ U-2  EXTREMITIES: SCDs on, non tender, no erythema, b/l Pearl's sign negative      Labs:     Hemoglobin   Date Value Ref Range Status   02/24/2024 12.2 11.5 - 15.4 g/dL Final   12/01/2023 11.4 (L) 11.7 - 15.5 g/dL Final   08/23/2023 12.1 11.7 - 15.5 g/dL Final   08/23/2023 12.1 11.7 - 15.5 g/dL Final   08/16/2021 12.8 11.5 - 15.4 g/dL Final     WBC   Date Value Ref Range Status   02/24/2024 11.80 (H) 4.31 - 10.16 Thousand/uL Final   08/16/2021 13.32 (H) 4.31 - 10.16 Thousand/uL Final     White Blood Cell Count   Date Value Ref Range Status   12/01/2023 8.5 3.8 - 10.8 Thousand/uL Final   08/23/2023 8.9 3.8 - 10.8 Thousand/uL Final     Platelet Count   Date Value Ref Range Status   12/01/2023 239 140 - 400 Thousand/uL Final   08/23/2023 297 140 - 400 Thousand/uL Final     Platelets   Date Value Ref Range Status   02/24/2024 227 149 - 390 Thousands/uL Final   08/16/2021 281 149 - 390 Thousands/uL Final     Creatinine   Date Value Ref Range Status   02/24/2024 0.61 0.60 - 1.30 mg/dL Final     Comment:     Standardized to IDMS reference method   08/15/2021 0.65 0.60 - 1.30 mg/dL Final     Comment:     Standardized to IDMS reference method     AST   Date Value Ref Range Status   02/24/2024 15 13 - 39 U/L Final   08/15/2021 17 5 - 45 U/L Final     Comment:     Specimen collection should occur prior to Sulfasalazine administration due to the potential for falsely depressed results.      ALT   Date Value Ref Range Status   02/24/2024 11 7 - 52 U/L Final     Comment:     Specimen collection should occur prior to Sulfasalazine administration due to the potential for falsely depressed results.    08/15/2021 19 12 - 78 U/L Final     Comment:     Specimen collection should occur prior to Sulfasalazine administration due to the potential for falsely depressed results.           Allie Brooks MD  2/26/2024  6:34 AM

## 2024-02-26 NOTE — LACTATION NOTE
This note was copied from a baby's chart.  Met with parents to follow up and discuss the Breastfeeding Discharge Booklet.     Mother reports that baby is latching well.     Baby is currently at a 1.9% weight loss, having appropriate output for his age and 24 hour bilirubin was low.    Discussed the importance of ensuring that baby feeds 8-12x in 24 hours and that baby has 6 wet diapers or more that are becoming more dilute as well as soiled diapers that are transitioning demonstrated by color change from meconium to a yellow/gold seedy loose stool by day 5.    Mother was given resources to look up medications to ensure they are safe with breastfeeding, by communicating with the [facility], [facility], [facility] as well as using [employer]lact[employer] (assisted mother to pin to home screen on personal phone).    Mother is aware of engorgement time frame (when mature milk comes in) and management as well as how to deal with conditions that may occur while breastfeeding (plugged ducts, milk blebs and mastitis) and when is appropriate to communicate with her OB/GYN and/or a lactation consultant.    Mother is comfortable with how to set up a pump, how to cycle (stimulation vs expression phases during a pumping session), importance of flange fit and trying different sizes to ensure best fit, milk storage and how to properly clean parts. Discussed handouts for tips on pumping when returning to work and paced bottle feeding.    Addressed questions on contraception usage during lactation as well as milk supply questions.    Discussed community resources for continued support in breastfeeding once discharged home.     Parents were encouraged to call for further questions that arise prior to discharge.

## 2024-02-26 NOTE — UTILIZATION REVIEW
NOTIFICATION OF ADMISSION DISCHARGE   This is a Notification of Discharge from [facility]. Please be advised that this patient has been discharge from our facility. Below you will find the admission and discharge date and time including the patient’s disposition.   UTILIZATION REVIEW CONTACT:  Georgie Calix  Utilization   Network Utilization Review Department  Phone: [phone] x carefully listen to the prompts. All voicemails are confidential.  Email: [email or web address]     ADMISSION INFORMATION  PRESENTATION DATE: 2/24/2024  4:54 PM  OBERVATION ADMISSION DATE:   INPATIENT ADMISSION DATE: 2/24/24  5:58 PM   DISCHARGE DATE: 2/26/2024  3:15 PM   DISPOSITION:Home/Self Care    Network Utilization Review Department  ATTENTION: Please call with any questions or concerns to [phone] and carefully listen to the prompts so that you are directed to the right person. All voicemails are confidential.   For Discharge needs, contact Care Management DC Support Team at [phone] opt. 2  Send all requests for admission clinical reviews, approved or denied determinations and any other requests to dedicated fax number below belonging to the campus where the patient is receiving treatment. List of dedicated fax numbers for the Facilities:  FACILITY NAME UR FAX NUMBER   ADMISSION DENIALS (Administrative/Medical Necessity) [phone]   DISCHARGE SUPPORT TEAM ([facility]) [phone]   PARENT CHILD HEALTH (Maternity/NICU/Pediatrics) [phone]   [facility] [phone]   [facility] [phone]   [facility] [phone]   [facility] [phone]   Pending sale to [facility] [phone]   [facility] [phone]   General acute hospital [phone]   [facility] [phone]   [facility]  [facility] [phone]   [facility] [phone]   [facility] [phone]   [facility] [phone]

## 2024-02-26 NOTE — UTILIZATION REVIEW
"NOTIFICATION OF INPATIENT ADMISSION   MATERNITY/DELIVERY AUTHORIZATION REQUEST   SERVICING FACILITY:   [facility]  Parent Child Health - L&D, [place], NICU  [address] [place] [address]  Tax ID: [id number]  NPI: [id number]   ATTENDING PROVIDER:  Attending Name and NPI#: Valorie Bob Do [[id number]]  Address: [address] [place] [address]  Phone: [phone]   ADMISSION INFORMATION:  Place of Service: Inpatient [facility]  Place of Service Code: 21  Inpatient Admission Date/Time: 24  5:58 PM  Discharge Date/Time: No discharge date for patient encounter.  Admitting Diagnosis Code/Description:  Uterine contractions during pregnancy [O47.9]  39 weeks gestation of pregnancy [Z3A.39]  Encounter for full-term uncomplicated delivery [O80]     Mother: Sheila Young 1990 Estimated Date of Delivery: 24  Delivering clinician: Valorie Bob    OB History          2    Para   2    Term   2       0    AB   0    Living   2         SAB   0    IAB   0    Ectopic   0    Multiple   0    Live Births   2           Obstetric Comments    Hgb electrophoresis WNL; varicella immune; CF neg; SMA pos -  negative             [place] Name & MRN:   Information for the patient's :  Hector, Baby Boy (Sheila) [[id number]]   [place] Delivery Information:  Sex: male  Delivered 2024 5:39 AM by Vaginal, Spontaneous; Gestational Age: 39w5d     Measurements:  Weight: 9 lb 4 oz (4195 g);  Height: 21.5\"    APGAR 1 minute 5 minutes 10 minutes   Totals: 9 9       UTILIZATION REVIEW CONTACT:  Georgie Calix, Utilization   Network Utilization Review Department  Phone: [phone]  Fax [phone]  Email: [email or web address]  Contact for approvals/pending authorizations, clinical reviews, and discharge.     PHYSICIAN ADVISORY SERVICES:  Medical Necessity Denial & Wcez-wx-Cspf Review  Phone: [phone]  Fax: " [phone]  Email: [email or web address]     DISCHARGE SUPPORT TEAM:  For Patients Discharge Needs & Updates  Phone: [phone] opt. 2 Fax: [phone]  Email: [email or web address]

## 2024-02-29 ENCOUNTER — TELEPHONE (OUTPATIENT)
Dept: OBGYN CLINIC | Facility: CLINIC | Age: 34
End: 2024-02-29

## 2024-02-29 NOTE — TELEPHONE ENCOUNTER
POSTPARTUM PHONE CALL ASSESSMENT    Date of Delivery: 24  Delivering Provider: Paulino  Mode:   Delivery Notes/Complications: patient reports one or two high BP readings in hospital, was anxious at start of labor and being admitted and then all normal readings after. Pt denies any symptoms at this time.   Do you still have bleeding/pain? If so, how much/how severe? Light VB per pt, varies BRB to brown in color. Changing pad every few hours, not fully saturated. Patient reports mild to moderate cramping, intermittent, relieved with ibuprofen prn.   Regular BMs/Urination? Normal bm and no issues with urination.  Breastfeeding/Formula/Both? Breastfeeding. No concerns at this time.   How are you doing emotionally? Patient reports good.   EPDS Score: 5  Do you have any other questions or concerns for us or your provider? None at this time  Have you scheduled the pediatrician appointment with pediatrician? Completed yesterday 24  Do you have a postpartum visit scheduled? Patient states on day of discharge she was instructed by provider to call office and schedule BP check. Appt offered and scheduled.   Date scheduled: BP check 3/7/24 and PP 3/21/24 Provider: Judy

## 2024-03-02 LAB — PLACENTA IN STORAGE: NORMAL

## 2024-03-08 ENCOUNTER — TELEPHONE (OUTPATIENT)
Age: 34
End: 2024-03-08

## 2024-03-08 ENCOUNTER — TELEPHONE (OUTPATIENT)
Dept: POSTPARTUM | Facility: CLINIC | Age: 34
End: 2024-03-08

## 2024-03-08 ENCOUNTER — POSTPARTUM VISIT (OUTPATIENT)
Dept: OBGYN CLINIC | Facility: CLINIC | Age: 34
End: 2024-03-08

## 2024-03-08 ENCOUNTER — NURSE TRIAGE (OUTPATIENT)
Age: 34
End: 2024-03-08

## 2024-03-08 VITALS
WEIGHT: 201 LBS | BODY MASS INDEX: 33.49 KG/M2 | TEMPERATURE: 101.4 F | SYSTOLIC BLOOD PRESSURE: 130 MMHG | HEIGHT: 65 IN | DIASTOLIC BLOOD PRESSURE: 80 MMHG

## 2024-03-08 DIAGNOSIS — N61.0 MASTITIS: Primary | ICD-10-CM

## 2024-03-08 PROBLEM — Z3A.36 36 WEEKS GESTATION OF PREGNANCY: Status: RESOLVED | Noted: 2023-12-11 | Resolved: 2024-03-08

## 2024-03-08 PROBLEM — O99.013 ANEMIA AFFECTING PREGNANCY IN THIRD TRIMESTER: Status: RESOLVED | Noted: 2024-01-22 | Resolved: 2024-03-08

## 2024-03-08 PROCEDURE — 99024 POSTOP FOLLOW-UP VISIT: CPT | Performed by: PHYSICIAN ASSISTANT

## 2024-03-08 RX ORDER — CEPHALEXIN 500 MG/1
500 CAPSULE ORAL EVERY 6 HOURS SCHEDULED
Qty: 40 CAPSULE | Refills: 0 | Status: SHIPPED | OUTPATIENT
Start: 2024-03-08 | End: 2024-03-18

## 2024-03-08 NOTE — TELEPHONE ENCOUNTER
"Patient called into office with concerns for mastitis.  Stated she had chills last night, her temp was 100.4.  this morning her temp was 101.4.  still have chills, right breast is red and warm.  She is still breastfeeding and has tried ibuprofen to help with fever control.     Patient scheduled for office visit with provider this morning for evaluation. Advised to call office if her symptoms worsen between now and appointment time.      She has no further questions at this time.     Reason for Disposition   Breast looks infected (area of redness, feels hot to touch) and no fever    Answer Assessment - Initial Assessment Questions  1. PAIN: \"How bad is the pain?\"  (Scale 1-10; or mild, moderate, severe)    - MILD - doesn't interfere with normal activities     - MODERATE - interferes with normal activities or awakens from sleep     - SEVERE - excruciating pain, unable to do any normal activities       Moderate    2. SKIN: \"Does the skin appear red?\"        Right side warm and red    3. LOCATION: \"Which breast?\" (e.g., left, right, both)      Right    4. DELIVERY: \"When was the baby born?\"       02/25/2024    5. BREASTFEEDING: \"Have you been breastfeeding?\" If yes, ask: \"When did you stop?\"      Yes    6. ONSET: \"When did the breast pain start?\" (e.g., hours, days)      Last night    7. FEVER: \"Do you have a fever?\" If Yes, ask: \"What is it, how was it measured, and when did it start?\"      101.4 this morning    8. OTHER SYMPTOMS: \"Do you have any other symptoms?\" (e.g., abdominal pain, feeling sad or depressed, weakness)      Tender to touch,    Protocols used: Postpartum - Breast Pain and Engorgement-ADULT-[place]    "

## 2024-03-08 NOTE — TELEPHONE ENCOUNTER
"[facility] On the right side she has tenderness, swelling, redness and had a fever since last night. Doctors prescribed abx. She has not started these yet    Eugenie Olson is feeding at the breast ATC every 2-2.5 hours with some periods of cluster feedings at times. She did not feed he was draining the breast more in the morning or at night. Mom is using the haakaa to mange the engorgement. She uses this 3-4 times per day, collecting 1.5-2 oz on the right and 3 oz on the left each time.     Discussed how these symptoms are likely related to hyperlactation. Recommended supportive care measures - cold compress, ibuprofen and acetaminophen if safe per her provider, supportive bra, very gentle handling of the breast. If these treatments do not show improvement within the next day or so, abx are available as prescribed.     Limit milk removal beyond baby's feedings. Okay to pump a small amount if needed for painful engorgement, but do not \"empty\" breasts with the pump in between feedings. If there are latching or milk transfer concerns, please call back to scheduled an appointment with a lactation consultant.   "

## 2024-03-08 NOTE — TELEPHONE ENCOUNTER
Sheila called - has concerns of mastitis, was referred to call here for advice on how to unclog ducts.     Wesley 12d.

## 2024-03-08 NOTE — PROGRESS NOTES
"Assessment/Plan:    No problem-specific Assessment & Plan notes found for this encounter.       Diagnoses and all orders for this visit:    Mastitis  -     dicloxacillin (DYNAPEN) 500 MG capsule; Take 1 capsule (500 mg total) by mouth 4 (four) times a day for 10 days        Suspect mastitis.  Rx for dicloxacillin 500 mg QID x 10 days sent to pharmacy. Can continue to alternate Motrin and Tylenol as needed.  Use ice packs and/or warm showers.  Continue breastfeed b/l.  Recommended speaking with lactation for possible addition of pumping.  F/u on 3/21 for PP visit as planned.  Call if symptoms worsen or change.    Subjective:      Patient ID: Sheila Young is a 34 y.o. female.    Patient is here with complaint of probable mastitis.  She is currently 2 weeks PP after a .  Patient is breastfeeding. States symptoms started last night with R inner breast pain, fever/chills, and feeling \"off\".  She took ibuprofen over night to decrease fever, but has not taken any yet this AM.  R inner breast is mildly erythematous and still very tender.  She has continued to breastfeed.  Patient is not pumping in between, just using the Haaka to collect milk when she feels engorged.        The following portions of the patient's history were reviewed and updated as appropriate: allergies, current medications, past family history, past medical history, past social history, past surgical history, and problem list.    Review of Systems   Constitutional:  Positive for chills and fever.   Cardiovascular:         Erythema and tenderness of R inner breast.  No changes to L breast.         Objective:      /80 (BP Location: Left arm, Patient Position: Sitting, Cuff Size: Adult)   Temp (!) 101.4 °F (38.6 °C)   Ht 5' 5\" (1.651 m)   Wt 91.2 kg (201 lb)   LMP 2023   Breastfeeding Yes   BMI 33.45 kg/m²          Physical Exam  Vitals reviewed.   Constitutional:       Appearance: Normal appearance. She is well-developed. "   Chest:   Breasts:     Breasts are symmetrical.      Right: Tenderness present. No swelling, bleeding, inverted nipple, mass, nipple discharge or skin change.      Left: Normal. No swelling, bleeding, inverted nipple, mass, nipple discharge, skin change or tenderness.          Comments: Area of tenderness and mild erythema.  Lymphadenopathy:      Upper Body:      Right upper body: No supraclavicular or axillary adenopathy.      Left upper body: No supraclavicular or axillary adenopathy.   Skin:     General: Skin is warm and dry.   Neurological:      Mental Status: She is alert and oriented to person, place, and time.   Psychiatric:         Mood and Affect: Mood normal.         Behavior: Behavior normal. Behavior is cooperative.         Thought Content: Thought content normal.         Judgment: Judgment normal.

## 2024-03-08 NOTE — TELEPHONE ENCOUNTER
Contacted patient to advise new medication was sent to pharmacy on file for her to .      She has no further questions at this time.

## 2024-03-08 NOTE — PATIENT INSTRUCTIONS
Rx for dicloxacillin 500 mg QID x 10 days sent to pharmacy. Continue to alternate Tylenol and Motrin as needed.  Use ice packs and/or warm showers.    Continue to breastfeed from both breasts.    Call lactation for pumping recommendations.    Call if symptoms worsen or change.

## 2024-03-08 NOTE — TELEPHONE ENCOUNTER
Patient called in stating ABX that was prescribed was not recommended by the pediatrician for the baby.      Please send in different ABX for patient when able.  Thank you

## 2024-03-21 ENCOUNTER — POSTPARTUM VISIT (OUTPATIENT)
Dept: OBGYN CLINIC | Facility: CLINIC | Age: 34
End: 2024-03-21
Payer: OTHER GOVERNMENT

## 2024-03-21 VITALS
HEIGHT: 65 IN | SYSTOLIC BLOOD PRESSURE: 122 MMHG | BODY MASS INDEX: 32.65 KG/M2 | WEIGHT: 196 LBS | DIASTOLIC BLOOD PRESSURE: 74 MMHG

## 2024-03-21 NOTE — PROGRESS NOTES
Subjective     Sheila Young is a 34 y.o. G2, P2 female here for a postpartum visit. She is 4 weeks post partum following a spontaneous vaginal delivery. I have fully reviewed the prenatal and intrapartum course. The delivery was at 39 gestational weeks. Outcome: .  Anesthesia: epidural.  Postpartum course has been complicated by episode of mastitis which is cleared up now she finished course of antibiotics still with slight oversupply of milk .  Baby's course has been doing well without problems. Baby is feeding breast.  Patient is tolerating regular diet, Bleeding thin lochia.  Bowel function is normal. Bladder function is normal. Patient is not sexually active. Contraception method is condoms. Postpartum depression screening: negative  EPDS 4   Patient concerned that she had a drop in milk supply after her hormonal IUD placed postpartum last time.  They are not sure if they desire future pregnancy but she would like to consider perhaps a hormonal IUD in the future when done nursing or after milk is excessively well-established.  We discussed using condoms and spermicide's for now, counseled on options including ParaGard.     Gynecologic History  Patient's last menstrual period was 2023.  Contraception: condoms  Last Pap: 2023. Results were: normal    Obstetric History  OB History    Para Term  AB Living   2 2 2 0 0 2   SAB IAB Ectopic Multiple Live Births   0 0 0 0 2      # Outcome Date GA Lbr Aidan/2nd Weight Sex Delivery Anes PTL Lv   2 Term 24 39w5d / 02:21 4195 g (9 lb 4 oz) M Vag-Spont EPI N ZAHRAA   1 Term 21 39w1d / 01:43 3335 g (7 lb 5.6 oz) F Vag-Spont EPI N ZAHRAA      Obstetric Comments    Hgb electrophoresis WNL; varicella immune; CF neg; SMA pos -  negative         The following portions of the patient's history were reviewed and updated as appropriate: allergies, current medications, past family history, past medical history, past social history,  "past surgical history, and problem list.    Review of Systems  Review of Systems     Objective     /74 (BP Location: Left arm, Patient Position: Sitting, Cuff Size: Standard)   Ht 5' 5\" (1.651 m)   Wt 88.9 kg (196 lb)   LMP 05/23/2023   Breastfeeding Yes   BMI 32.62 kg/m²   General appearance: alert and oriented, in no acute distress  Head: Normocephalic, without obvious abnormality, atraumatic  Lungs: clear to auscultation bilaterally  Heart: regular rate and rhythm, S1, S2 normal, no murmur, click, rub or gallop  Abdomen: soft, non-tender; bowel sounds normal; no masses,  no organomegaly  Pelvic: external genitalia normal, vagina normal without discharge, uterus normal size, shape, and consistency, no cervical motion tenderness, and no adnexal masses or tenderness  Extremities: extremities normal, warm and well-perfused; no cyanosis, clubbing, or edema      Assessment  34-year-old G2, P2 here for postpartum exam almost 4 weeks postpartum steadily recovering.  Recovered from recent episode of mastitis baby is nursing well.  May wish to consider hormonal IUD in the future but prefers to use completely nonhormonal method for now plans to use possibly condoms and spermicide.     Plan  Return in 3 months for annual or sooner as needed usually sees Dr. Kowalski    "

## 2024-08-31 ENCOUNTER — NURSE TRIAGE (OUTPATIENT)
Dept: OTHER | Facility: OTHER | Age: 34
End: 2024-08-31

## 2024-08-31 NOTE — TELEPHONE ENCOUNTER
"Regarding: migraine/ medication question  ----- Message from Gayle WARREN sent at 8/31/2024  9:50 AM EDT -----  Pt stated, \" I am currently breastfeeding and I have a migraine I was wondering if there is anything I can take stronger than tylenol while breast feeding.\"    "

## 2024-08-31 NOTE — TELEPHONE ENCOUNTER
"Reason for Disposition  • Maternal OTC medications, questions about    Answer Assessment - Initial Assessment Questions  1. MAIN QUESTION:  \"What is your main question about you and breastfeeding?\"      What medications are safe for headaches with breastfeeding  Home care advice provided. Advised acetaminophen and ibuprofen dosing and frequency, saline nasal spray for congestion/sinus pain, antihistamine for congestion and nausea/vomitng. Encouraged small frequent sips of fluids.    Answer Assessment - Initial Assessment Questions  1. LOCATION: \"Where does it hurt?\"       frontal  2. ONSET: \"When did the headache start?\" (Minutes, hours or days)       yesterday  3. PATTERN: \"Does the pain come and go, or has it been constant since it started?\"      constant  4. SEVERITY: \"How bad is the pain?\" and \"What does it keep you from doing?\"  (e.g., Scale 1-10; mild, moderate, or severe)    - MILD (1-3): doesn't interfere with normal activities     - MODERATE (4-7): interferes with normal activities or awakens from sleep     - SEVERE (8-10): excruciating pain, unable to do any normal activities         moderate  5. RECURRENT SYMPTOM: \"Have you ever had headaches before?\" If Yes, ask: \"When was the last time?\" and \"What happened that time?\"       denies  6. CAUSE: \"What do you think is causing the headache?\"      unsure  7. MIGRAINE: \"Have you been diagnosed with migraine headaches?\" If Yes, ask: \"Is this headache similar?\"       denies    9. OTHER SYMPTOMS: \"Do you have any other symptoms?\" (fever, stiff neck, eye pain, sore throat, cold symptoms)      Sinus pain, nausea, vomiting, is breastfeeding, 6 months post partum    Protocols used: Breastfeeding - Mother's Medicines and Diet-PEDIATRIC-, Headache-ADULT-    "

## 2025-02-10 ENCOUNTER — TELEPHONE (OUTPATIENT)
Dept: INTERNAL MEDICINE CLINIC | Facility: OTHER | Age: 35
End: 2025-02-10

## 2025-02-26 ENCOUNTER — OFFICE VISIT (OUTPATIENT)
Dept: FAMILY MEDICINE CLINIC | Facility: CLINIC | Age: 35
End: 2025-02-26
Payer: OTHER GOVERNMENT

## 2025-02-26 VITALS
OXYGEN SATURATION: 99 % | BODY MASS INDEX: 28.32 KG/M2 | TEMPERATURE: 96.9 F | DIASTOLIC BLOOD PRESSURE: 70 MMHG | HEIGHT: 65 IN | WEIGHT: 170 LBS | HEART RATE: 77 BPM | SYSTOLIC BLOOD PRESSURE: 118 MMHG

## 2025-02-26 DIAGNOSIS — Z00.00 ANNUAL PHYSICAL EXAM: Primary | ICD-10-CM

## 2025-02-26 DIAGNOSIS — Z30.9 ENCOUNTER FOR CONTRACEPTIVE MANAGEMENT, UNSPECIFIED TYPE: ICD-10-CM

## 2025-02-26 DIAGNOSIS — E66.3 OVERWEIGHT (BMI 25.0-29.9): ICD-10-CM

## 2025-02-26 PROBLEM — R03.0 ELEVATED BLOOD PRESSURE READING IN OFFICE WITHOUT DIAGNOSIS OF HYPERTENSION: Status: RESOLVED | Noted: 2023-12-11 | Resolved: 2025-02-26

## 2025-02-26 PROCEDURE — 99385 PREV VISIT NEW AGE 18-39: CPT | Performed by: FAMILY MEDICINE

## 2025-02-26 NOTE — PATIENT INSTRUCTIONS
"Patient Education     Routine physical for adults   The Basics   Written by the doctors and editors at [facility]   What is a physical? -- A physical is a routine visit, or \"check-up,\" with your doctor. You might also hear it called a \"wellness visit\" or \"preventive visit.\"  During each visit, the doctor will:   Ask about your physical and mental health   Ask about your habits, behaviors, and lifestyle   Do an exam   Give you vaccines if needed   Talk to you about any medicines you take   Give advice about your health   Answer your questions  Getting regular check-ups is an important part of taking care of your health. It can help your doctor find and treat any problems you have. But it's also important for preventing health problems.  A routine physical is different from a \"sick visit.\" A sick visit is when you see a doctor because of a health concern or problem. Since physicals are scheduled ahead of time, you can think about what you want to ask the doctor.  How often should I get a physical? -- It depends on your age and health. In general, for people age 21 years and older:   If you are younger than 50 years, you might be able to get a physical every 3 years.   If you are 50 years or older, your doctor might recommend a physical every year.  If you have an ongoing health condition, like diabetes or high blood pressure, your doctor will probably want to see you more often.  What happens during a physical? -- In general, each visit will include:   Physical exam - The doctor or nurse will check your height, weight, heart rate, and blood pressure. They will also look at your eyes and ears. They will ask about how you are feeling and whether you have any symptoms that bother you.   Medicines - It's a good idea to bring a list of all the medicines you take to each doctor visit. Your doctor will talk to you about your medicines and answer any questions. Tell them if you are having any side effects that bother you. You " "should also tell them if you are having trouble paying for any of your medicines.   Habits and behaviors - This includes:   Your diet   Your exercise habits   Whether you smoke, drink alcohol, or use drugs   Whether you are sexually active   Whether you feel safe at home  Your doctor will talk to you about things you can do to improve your health and lower your risk of health problems. They will also offer help and support. For example, if you want to quit smoking, they can give you advice and might prescribe medicines. If you want to improve your diet or get more physical activity, they can help you with this, too.   Lab tests, if needed - The tests you get will depend on your age and situation. For example, your doctor might want to check your:   Cholesterol   Blood sugar   Iron level   Vaccines - The recommended vaccines will depend on your age, health, and what vaccines you already had. Vaccines are very important because they can prevent certain serious or deadly infections.   Discussion of screening - \"Screening\" means checking for diseases or other health problems before they cause symptoms. Your doctor can recommend screening based on your age, risk, and preferences. This might include tests to check for:   Cancer, such as breast, prostate, cervical, ovarian, colorectal, prostate, lung, or skin cancer   Sexually transmitted infections, such as chlamydia and gonorrhea   Mental health conditions like depression and anxiety  Your doctor will talk to you about the different types of screening tests. They can help you decide which screenings to have. They can also explain what the results might mean.   Answering questions - The physical is a good time to ask the doctor or nurse questions about your health. If needed, they can refer you to other doctors or specialists, too.  Adults older than 65 years often need other care, too. As you get older, your doctor will talk to you about:   How to prevent falling at " home   Hearing or vision tests   Memory testing   How to take your medicines safely   Making sure that you have the help and support you need at home  All topics are updated as new evidence becomes available and our peer review process is complete.  This topic retrieved from [employer] on: May 02, 2024.  Topic 892126 Version 1.0  Release: 32.4.3 - C32.122  © 2024 UpToDate, Inc. and/or its affiliates. All rights reserved.  Consumer Information Use and Disclaimer   Disclaimer: This generalized information is a limited summary of diagnosis, treatment, and/or medication information. It is not meant to be comprehensive and should be used as a tool to help the user understand and/or assess potential diagnostic and treatment options. It does NOT include all information about conditions, treatments, medications, side effects, or risks that may apply to a specific patient. It is not intended to be medical advice or a substitute for the medical advice, diagnosis, or treatment of a health care provider based on the health care provider's examination and assessment of a patient's specific and unique circumstances. Patients must speak with a health care provider for complete information about their health, medical questions, and treatment options, including any risks or benefits regarding use of medications. This information does not endorse any treatments or medications as safe, effective, or approved for treating a specific patient. UpToDate, Inc. and its affiliates disclaim any warranty or liability relating to this information or the use thereof.The use of this information is governed by the Terms of Use, available at https://www.[employer].com/en/know/clinical-effectiveness-terms. 2024© UpToDate, Inc. and its affiliates and/or licensors. All rights reserved.  Copyright   © 2024 UpToDate, Inc. and/or its affiliates. All rights reserved.

## 2025-02-26 NOTE — PROGRESS NOTES
Adult Annual Physical  Name: Sheila Young      : 1990      MRN: [id number]  Encounter Provider: Marilin Edwards DO  Encounter Date: 2025   Encounter department: [facility]    Assessment & Plan  Annual physical exam    Anticipatory guidance is discussed regarding preventative care.  Patient is up-to-date on Pap smear.  Discussed vaccine counseling.         Overweight (BMI 25.0-29.9)      Recommend laboratory workup as noted below regarding elevated BMI.    Orders:  •  CBC and differential; Future  •  Comprehensive metabolic panel; Future  •  TSH, 3rd generation with Free T4 reflex; Future  •  Lipid Panel with Direct LDL reflex; Future    Encounter for contraceptive management, unspecified type    Referral is provided to OB/GYN for further evaluation.    Orders:  •  Ambulatory Referral to Obstetrics / Gynecology; Future      Immunizations and preventive care screenings were discussed with patient today. Appropriate education was printed on patient's after visit summary.    Counseling:  Alcohol/drug use: discussed moderation in alcohol intake, the recommendations for healthy alcohol use, and avoidance of illicit drug use.  Dental Health: discussed importance of regular tooth brushing, flossing, and dental visits.  Injury prevention: discussed safety/seat belts, safety helmets, smoke detectors, carbon monoxide detectors, and smoking near bedding or upholstery.  Sexual health: discussed sexually transmitted diseases, partner selection, use of condoms, avoidance of unintended pregnancy, and contraceptive alternatives.  Exercise: the importance of regular exercise/physical activity was discussed. Recommend exercise 3-5 times per week for at least 30 minutes.          History of Present Illness     Adult Annual Physical:  Patient presents for annual physical. Patient is presenting today to [facility] care.  Moved to the area a couple years ago.  Works as a physical therapist.  2  children..     Diet and Physical Activity:  - Diet/Nutrition: well balanced diet.  - Exercise:. no currently    General Health:  - Sleep: sleeps well. aims for 7 or more hours a night  - Hearing: normal hearing right ear and normal hearing left ear.  - Vision: no vision problems.  - Dental: regular dental visits.    /GYN Health:  - Follows with GYN: yes.   - Menopause: premenopausal.   - Last menstrual cycle: 2025.   - History of STDs: no  - Contraception: barrier methods.      Review of Systems   Constitutional:  Negative for fatigue and fever.   HENT:  Negative for congestion and sore throat.    Respiratory:  Negative for cough and shortness of breath.    Cardiovascular:  Negative for chest pain and palpitations.   Gastrointestinal:  Negative for abdominal pain, blood in stool, constipation, diarrhea, nausea and vomiting.   Genitourinary:  Negative for dysuria and hematuria.   Musculoskeletal:  Negative for arthralgias and myalgias.   Skin:  Negative for rash.   Neurological:  Negative for dizziness and headaches.   Psychiatric/Behavioral:  Negative for dysphoric mood. The patient is not nervous/anxious.      Medical History Reviewed by provider this encounter:  Tobacco  Allergies  Meds  Problems  Med Hx  Surg Hx  Fam Hx     .  Past Medical History   Past Medical History:   Diagnosis Date   • Acne    • Allergic     seasonal   • Anemia affecting pregnancy in third trimester 2024   • Elevated blood pressure reading in office with diagnosis of hypertension 2023   • Elevated blood pressure reading in office without diagnosis of hypertension 2023    One elevated reading on  140/70.    •  (spontaneous vaginal delivery) 2024   • Varicella     vaccinated     Past Surgical History:   Procedure Laterality Date   • WISDOM TOOTH EXTRACTION     • WISDOM TOOTH EXTRACTION       Family History   Problem Relation Age of Onset   • Anxiety disorder Mother    • Hypertension Mother    •  "Depression Mother    • Osteoarthritis Mother    • Graves' disease Mother    • Hypertension Father    • Gout Father    • Hyperlipidemia Father    • Osteoarthritis Father    • Ovarian cancer Maternal Grandmother          at 50   • Cancer Maternal Grandmother    • Esophageal cancer Maternal Grandfather    • Stroke Paternal Grandmother    • Deep vein thrombosis Paternal Grandfather    • Pulmonary embolism Paternal Grandfather    • Heart attack Paternal Grandfather    • Ovarian cancer Maternal Aunt 50      reports that she has never smoked. She has never used smokeless tobacco. She reports that she does not currently use alcohol. She reports that she does not use drugs.  Current Outpatient Medications   Medication Instructions   • Prenatal MV-Min-Fe Fum-FA-DHA (PRENATAL+DHA PO) 2 tablets, Daily   No Known Allergies   Current Outpatient Medications on File Prior to Visit   Medication Sig Dispense Refill   • Prenatal MV-Min-Fe Fum-FA-DHA (PRENATAL+DHA PO) Take 2 tablets by mouth daily       No current facility-administered medications on file prior to visit.      Social History     Tobacco Use   • Smoking status: Never   • Smokeless tobacco: Never   Vaping Use   • Vaping status: Never Used   Substance and Sexual Activity   • Alcohol use: Not Currently     Comment: occasional   • Drug use: No   • Sexual activity: Yes     Partners: Male     Birth control/protection: None       Objective   /70 (BP Location: Left arm, Patient Position: Sitting, Cuff Size: Standard)   Pulse 77   Temp (!) 96.9 °F (36.1 °C) (Tympanic)   Ht 5' 5\" (1.651 m)   Wt 77.1 kg (170 lb)   SpO2 99%   BMI 28.29 kg/m²     Physical Exam  Vitals reviewed.   Constitutional:       General: She is not in acute distress.     Appearance: She is well-developed.   HENT:      Head: Normocephalic and atraumatic.      Right Ear: Tympanic membrane, ear canal and external ear normal.      Left Ear: Tympanic membrane, ear canal and external ear normal.      " Nose: Nose normal.      Mouth/Throat:      Mouth: Mucous membranes are moist.      Pharynx: Oropharynx is clear.   Eyes:      Conjunctiva/sclera: Conjunctivae normal.      Pupils: Pupils are equal, round, and reactive to light.   Cardiovascular:      Rate and Rhythm: Normal rate and regular rhythm.      Heart sounds: No murmur heard.  Pulmonary:      Effort: Pulmonary effort is normal. No respiratory distress.      Breath sounds: Normal breath sounds.   Abdominal:      Palpations: Abdomen is soft.      Tenderness: There is no abdominal tenderness.   Musculoskeletal:      Cervical back: Neck supple.      Right lower leg: No edema.      Left lower leg: No edema.   Lymphadenopathy:      Cervical: No cervical adenopathy.   Skin:     General: Skin is warm and dry.   Neurological:      General: No focal deficit present.      Mental Status: She is alert and oriented to person, place, and time.   Psychiatric:         Mood and Affect: Mood normal.         Behavior: Behavior normal.

## 2025-03-02 NOTE — ASSESSMENT & PLAN NOTE
Recommend laboratory workup as noted below regarding elevated BMI.    Orders:  •  CBC and differential; Future  •  Comprehensive metabolic panel; Future  •  TSH, 3rd generation with Free T4 reflex; Future  •  Lipid Panel with Direct LDL reflex; Future

## 2025-04-12 ENCOUNTER — OFFICE VISIT (OUTPATIENT)
Dept: URGENT CARE | Age: 35
End: 2025-04-12
Payer: OTHER GOVERNMENT

## 2025-04-12 VITALS
HEART RATE: 76 BPM | RESPIRATION RATE: 16 BRPM | TEMPERATURE: 99.8 F | SYSTOLIC BLOOD PRESSURE: 128 MMHG | DIASTOLIC BLOOD PRESSURE: 72 MMHG | OXYGEN SATURATION: 99 %

## 2025-04-12 DIAGNOSIS — J01.00 ACUTE MAXILLARY SINUSITIS, RECURRENCE NOT SPECIFIED: Primary | ICD-10-CM

## 2025-04-12 PROCEDURE — G0463 HOSPITAL OUTPT CLINIC VISIT: HCPCS

## 2025-04-12 PROCEDURE — 99213 OFFICE O/P EST LOW 20 MIN: CPT

## 2025-04-12 NOTE — PROGRESS NOTES
[facility]        NAME: Sheila Young is a 35 y.o. female  : 1990    MRN: [id number]  DATE: 2025  TIME: 4:26 PM      Assessment and Plan     Acute maxillary sinusitis, recurrence not specified [J01.00]  1. Acute maxillary sinusitis, recurrence not specified  amoxicillin-clavulanate (AUGMENTIN) 875-125 mg per tablet        Patient educated and verbalizes understanding that breast-feeding child may get an upset stomach with loose stool with taking Augmentin with breast-feeding.    Patient Instructions     Take antibiotic as prescribed. Recommend probiotic use while taking antibiotic.   Mucinex OTC.  Acetaminophen or ibuprofen for fever and pain.   Follow-up with PCP in 3-5 days.   Go to ER if symptoms worsen.     Chief Complaint     Chief Complaint   Patient presents with    Fever     Patient reports cough, congestion with sinus pressure and fever X 1 week. Has used ibuprofen and OTC medications with no relief. Patient reports she is breastfeeding.         History of Present Illness     Patient is a 35-year-old female who presents with sinus pain and pressure along her cheeks.  States symptoms started approximately 1 week ago.  Reports cough and fever.  States she is breast-feeding.  Denies chance of pregnancy.    Fever  Associated symptoms include congestion, coughing and a fever.       Review of Systems     Review of Systems   Constitutional:  Positive for fever.   HENT:  Positive for congestion, sinus pressure and sinus pain.    Respiratory:  Positive for cough.    All other systems reviewed and are negative.        Current Medications       Current Outpatient Medications:     amoxicillin-clavulanate (AUGMENTIN) 875-125 mg per tablet, Take 1 tablet by mouth every 12 (twelve) hours for 7 days, Disp: 14 tablet, Rfl: 0    Prenatal MV-Min-Fe Fum-FA-DHA (PRENATAL+DHA PO), Take 2 tablets by mouth daily, Disp: , Rfl:     Current Allergies     Allergies as of 2025    (No Known  Allergies)              The following portions of the patient's history were reviewed and updated as appropriate: allergies, current medications, past family history, past medical history, past social history, past surgical history and problem list.     Past Medical History:   Diagnosis Date    Acne     Allergic     seasonal    Anemia affecting pregnancy in third trimester 2024    Elevated blood pressure reading in office with diagnosis of hypertension 2023    Elevated blood pressure reading in office without diagnosis of hypertension 2023    One elevated reading on  140/70.      (spontaneous vaginal delivery) 2024    Varicella     vaccinated       Past Surgical History:   Procedure Laterality Date    WISDOM TOOTH EXTRACTION      WISDOM TOOTH EXTRACTION         Family History   Problem Relation Age of Onset    Anxiety disorder Mother     Hypertension Mother     Depression Mother     Osteoarthritis Mother     Graves' disease Mother     Hypertension Father     Gout Father     Hyperlipidemia Father     Osteoarthritis Father     Ovarian cancer Maternal Grandmother          at 50    Cancer Maternal Grandmother     Esophageal cancer Maternal Grandfather     Stroke Paternal Grandmother     Deep vein thrombosis Paternal Grandfather     Pulmonary embolism Paternal Grandfather     Heart attack Paternal Grandfather     Ovarian cancer Maternal Aunt 50         Medications have been verified.        Objective     /72   Pulse 76   Temp 99.8 °F (37.7 °C)   Resp 16   SpO2 99%   No LMP recorded.         Physical Exam     Physical Exam  Vitals and nursing note reviewed.   Constitutional:       General: She is awake. She is not in acute distress.     Appearance: Normal appearance. She is not ill-appearing, toxic-appearing or diaphoretic.   HENT:      Right Ear: Tympanic membrane, ear canal and external ear normal.      Left Ear: Tympanic membrane, ear canal and external ear normal.       Nose: Congestion present.      Right Sinus: Maxillary sinus tenderness present. No frontal sinus tenderness.      Left Sinus: Maxillary sinus tenderness present. No frontal sinus tenderness.      Mouth/Throat:      Lips: Pink.      Mouth: Mucous membranes are moist.      Pharynx: Oropharynx is clear. Uvula midline. No pharyngeal swelling, oropharyngeal exudate, posterior oropharyngeal erythema or uvula swelling.   Cardiovascular:      Rate and Rhythm: Normal rate.      Pulses: Normal pulses.      Heart sounds: Normal heart sounds, S1 normal and S2 normal.   Pulmonary:      Effort: Pulmonary effort is normal.      Breath sounds: Normal breath sounds and air entry. No stridor, decreased air movement or transmitted upper airway sounds. No decreased breath sounds, wheezing or rhonchi.   Skin:     General: Skin is warm.      Capillary Refill: Capillary refill takes less than 2 seconds.   Neurological:      Mental Status: She is alert.   Psychiatric:         Mood and Affect: Mood normal.         Behavior: Behavior normal.         Thought Content: Thought content normal.         Judgment: Judgment normal.

## 2025-04-12 NOTE — PATIENT INSTRUCTIONS
Take antibiotic as prescribed. Recommend probiotic use while taking antibiotic.   Mucinex OTC.  Acetaminophen or ibuprofen for fever and pain.   Follow-up with PCP in 3-5 days.   Go to ER if symptoms worsen.

## 2025-05-21 ENCOUNTER — ANNUAL EXAM (OUTPATIENT)
Dept: OBGYN CLINIC | Facility: MEDICAL CENTER | Age: 35
End: 2025-05-21
Payer: OTHER GOVERNMENT

## 2025-05-21 VITALS
HEIGHT: 65 IN | DIASTOLIC BLOOD PRESSURE: 68 MMHG | BODY MASS INDEX: 28.31 KG/M2 | WEIGHT: 169.9 LBS | SYSTOLIC BLOOD PRESSURE: 118 MMHG

## 2025-05-21 DIAGNOSIS — Z01.419 ENCNTR FOR GYN EXAM (GENERAL) (ROUTINE) W/O ABN FINDINGS: Primary | ICD-10-CM

## 2025-05-21 PROCEDURE — 99395 PREV VISIT EST AGE 18-39: CPT | Performed by: OBSTETRICS & GYNECOLOGY

## 2025-05-21 NOTE — PATIENT INSTRUCTIONS
Patient Education     Levonorgestrel (IUD) (AMANDA stevens)   Brand Names: US Kyleena; Liletta (52 MG); Mirena (52 MG); Brooke   Brand Names: Jared Kyleena; Mirena   What is this drug used for?   It is used to prevent pregnancy.  It is used to treat heavy bleeding during monthly periods (menstruation).  What do I need to tell my doctor BEFORE I take this drug?   If you are allergic to this drug; any part of this drug; or any other drugs, foods, or substances. Tell your doctor about the allergy and what signs you had.  If you are pregnant or may be pregnant. Do not use this drug if you are pregnant.  If you have an IUD (intrauterine device) in place.  If you have any of these health problems: Active liver disease, chlamydia or gonorrhea, endometritis (including after a birth), genital tract infection, liver tumor, pelvic infection, uterine or cervical tumor or growth, uterine problems like uterine fibroids, or untreated cervicitis or vaginitis.  If you have had a pelvic infection within the past 3 months that happened before, during, or after an  or miscarriage.  If you have unexplained vaginal bleeding.  If you have ever had any of these health problems: Breast cancer, cancer where hormones make it grow, or pelvic inflammatory disease.  If you have a weak immune system, you use or abuse IV drugs, or you have a disease that may cause a weak immune system like HIV.  If you or your partner have sex with more than one person.  This is not a list of all drugs or health problems that interact with this drug.  Tell your doctor and pharmacist about all of your drugs (prescription or OTC, natural products, vitamins) and health problems. You must check to make sure that it is safe for you to take this drug with all of your drugs and health problems. Do not start, stop, or change the dose of any drug without checking with your doctor.  What are some things I need to know or do while I take this drug?   Tell all  of your health care providers that you take this drug. This includes your doctors, nurses, pharmacists, and dentists.  Ovarian cysts may rarely happen. Most of the time, these go back to normal in 2 to 3 months. Sometimes, ovarian cysts can cause pain and the need for surgery. Talk with the doctor.  If you get pregnant while taking this drug, the chance of pregnancy outside of the uterus (ectopic pregnancy) may be raised. Talk with your doctor.  This drug does not stop the spread of diseases like HIV or hepatitis that are passed through having sex. Do not have any kind of sex without using a latex or polyurethane condom. If you have questions, talk with your doctor.  If you are having an MRI, talk with your doctor.  Life-threatening infection can happen within a few days after this drug was put in. Call your doctor right away if you have fever or pain where this drug was placed.  Very bad health problems and the need for surgery can happen if this drug goes through the uterus. Most of the time, this happens when this drug is put in but can happen at any time during use. The chance is raised if this drug is put in while you are breast-feeding. This drug may also not prevent pregnancy if this happens. If you have questions, talk with your doctor.  This drug may raise the chance of a health problem called pelvic inflammatory disease (PID). The chance may be higher if you or your partner have sex with other partners. PID can lead to other health problems like not being able to get pregnant, surgery, or rarely death. Talk with your doctor.  If you cannot feel the string or you think the IUD has come out, call your doctor. The risk of the IUD coming out may be higher if you have heavy menstrual bleeding or a higher than normal body mass index (BMI). You may get pregnant if this drug comes out. Use another kind of birth control like a condom until you see your doctor. If you think the IUD has come out and you had sex  within the last week, call your doctor. You may be at risk to get pregnant.  Menstrual periods may stop in some people after 1 year of using this drug. Periods will go back to normal when this drug is taken out. If you do not have a period for 6 weeks when this drug is in place, call your doctor.  You may need to have a pregnancy test before this drug is put in. If you have questions, talk with your doctor.  If you think you may be pregnant while this drug is in place, call your doctor right away. Severe and sometimes deadly health problems can happen when this drug is removed or if it needs to be left in place during pregnancy. This includes loss of fertility, infections, and loss of the unborn baby.  Tell your doctor if you are breast-feeding. You will need to talk about any risks to your baby.  What are some side effects that I need to call my doctor about right away?   WARNING/CAUTION: Even though it may be rare, some people may have very bad and sometimes deadly side effects when taking a drug. Tell your doctor or get medical help right away if you have any of the following signs or symptoms that may be related to a very bad side effect:  Signs of an allergic reaction, like rash; hives; itching; red, swollen, blistered, or peeling skin with or without fever; wheezing; tightness in the chest or throat; trouble breathing, swallowing, or talking; unusual hoarseness; or swelling of the mouth, face, lips, tongue, or throat.  Signs of high blood pressure like very bad headache or dizziness, passing out, or change in eyesight.  Weakness on 1 side of the body, trouble speaking or thinking, change in balance, drooping on one side of the face, or blurred eyesight.  Chest pain or pressure.  Stomach pain.  Pelvic pain.  Vaginal bleeding that is not normal.  Vaginal itching or discharge.  Fever or chills.  A lump in the breast or breast soreness.  Painful sex.  Yellow skin or eyes.  Depression or other mood  changes.  Genital sores.  Some pain, bleeding, dizziness, or other reactions may happen when the IUD is put in or taken out. Talk with your doctor right away if these are severe, do not go away within 30 minutes after the IUD is put in, or happen after it is taken out. Talk with your doctor right away if you have other reactions like seizures, slow heartbeat, or passing out.  What are some other side effects of this drug?   All drugs may cause side effects. However, many people have no side effects or only have minor side effects. Call your doctor or get medical help if any of these side effects or any other side effects bother you or do not go away:  Acne or greasy skin.  Headache.  Upset stomach or throwing up.  Painful periods.  Back pain.  Weight gain.  Vaginal bleeding and spotting between menstrual periods may happen, especially during the first 3 to 6 months. Cramping may also happen during the first few weeks. Call your doctor if the bleeding stays heavier than normal or if it gets heavier after it has been light for a while.  These are not all of the side effects that may occur. If you have questions about side effects, call your doctor. Call your doctor for medical advice about side effects.  You may report side effects to your national health agency.  You may report side effects to the FDA at 1-556.642.1893. You may also report side effects at https://www.fda.gov/medwatch.  How is this drug best taken?   Use this drug as ordered by your doctor. Read all information given to you. Follow all instructions closely.  This drug will be given to you by a doctor.  Follow up with the doctor as you have been told.  Check to see if this drug is in place as you have been told by your doctor or read the package insert.  Based on when this drug is put in, you may need to use a non-hormone type of birth control like condoms to prevent pregnancy for some time. Follow what your doctor has told you to do about when to have  this drug put in and using a non-hormone type of birth control.  If this drug is being removed and you do not want to get pregnant, talk with your doctor. You will need to use another kind of birth control like a condom the week before it is removed.  This drug is not for use as emergency birth control. Talk with the doctor.  Tampons or menstrual cups may be used with this drug. Change these with care to avoid pulling the threads of this drug. If you think you may have pulled this drug out of place, use a non-hormone type of birth control like condoms or spermicide or do not have sex, and call your doctor.  What do I do if I miss a dose?   Call your doctor to find out what to do.  How do I store and/or throw out this drug?   If you need to store this drug at home, talk with your doctor, nurse, or pharmacist about how to store it.  General drug facts   If your symptoms or health problems do not get better or if they become worse, call your doctor.  Do not share your drugs with others and do not take anyone else's drugs.  Keep all drugs in a safe place. Keep all drugs out of the reach of children and pets.  Throw away unused or  drugs. Do not flush down a toilet or pour down a drain unless you are told to do so. Check with your pharmacist if you have questions about the best way to throw out drugs. There may be drug take-back programs in your area.  Some drugs may have another patient information leaflet. If you have any questions about this drug, please talk with your doctor, nurse, pharmacist, or other health care provider.  Some drugs may have another patient information leaflet. Check with your pharmacist. If you have any questions about this drug, please talk with your doctor, nurse, pharmacist, or other health care provider.  If you think there has been an overdose, call your poison control center or get medical care right away. Be ready to tell or show what was taken, how much, and when it  happened.  Consumer Information Use and Disclaimer   This generalized information is a limited summary of diagnosis, treatment, and/or medication information. It is not meant to be comprehensive and should be used as a tool to help the user understand and/or assess potential diagnostic and treatment options. It does NOT include all information about conditions, treatments, medications, side effects, or risks that may apply to a specific patient. It is not intended to be medical advice or a substitute for the medical advice, diagnosis, or treatment of a health care provider based on the health care provider's examination and assessment of a patient's specific and unique circumstances. Patients must speak with a health care provider for complete information about their health, medical questions, and treatment options, including any risks or benefits regarding use of medications. This information does not endorse any treatments or medications as safe, effective, or approved for treating a specific patient. [employer] and its affiliates disclaim any warranty or liability relating to this information or the use thereof. The use of this information is governed by the Terms of Use, available at https://www.wolterskluwer.com/en/know/clinical-effectiveness-terms.  Last Reviewed Date   2023-07-26  Copyright   © 2024 [employer] and its affiliates and/or licensors. All rights reserved.

## 2025-05-21 NOTE — PROGRESS NOTES
ASSESSMENT & PLAN: Sheila was seen today for gynecologic exam.    Diagnoses and all orders for this visit:    Encntr for gyn exam (general) (routine) w/o abn findings          1.  Routine well woman exam done today  2.  Pap and HPV:  Patient's pap is current.  Pap and cotesting was not done today.  Current ASCCP Guidelines reviewed.   3.  Patient has had her Gardasil vaccination.  Recommendations reviewed.  4.  The following were reviewed in today's visit: adequate intake of calcium and vitamin D, exercise, and healthy diet.  5.  F/u in 1 year for next routine GYN exam.  6.  Contraception: Patient may be interested in the Mirena IUD.  Patient given information on her AVS.  Patient may return for insertion.  7.  Vulvar irritation: Patient to use over-the-counter hydrocortisone cream and antifungal cream x 10 days twice daily to the area then as needed.  Patient to follow-up with continued issues.    CC:  Annual Gynecologic Examination    HPI: Sheila Young is a 35 y.o.  who presents for annual gynecologic examination.  She has the following concerns:  pt is nursing.  Menses are regular; has had 4-5 menses.  No h/o menstrual issues.  May want the IUD again.  Patient reports she gets itching which started at the end of her last pregnancy which goes from front to back.    Health Maintenance:    Patient reports her health to be good.  She does not have weight concerns.  She exercises with running after her kids.    She does wear her seatbelt routinely.    She does perform regular monthly self breast exams.    She does feels safe at home.   Patients does not follow a special diet.  She gets 1 servings of dairy or calcium rich foods a day.    Problem List[1]    Past Medical History:   Diagnosis Date    Acne     Allergic     seasonal    Anemia affecting pregnancy in third trimester 2024    Elevated blood pressure reading in office with diagnosis of hypertension 2023    Elevated blood pressure reading  in office without diagnosis of hypertension 2023    One elevated reading on  140/70.      (spontaneous vaginal delivery) 2024    Varicella     vaccinated       Past Surgical History:   Procedure Laterality Date    WISDOM TOOTH EXTRACTION      WISDOM TOOTH EXTRACTION         Past OB/Gyn History:  Pt does not have menstrual issues.      History of sexually transmitted infection: No.  History of abnormal pap smears: No.  Last pap 23 nl and neg HPV  Patient is currently sexually active.  heterosexual.   The current method of family planning is condoms.    Family History   Problem Relation Age of Onset    Anxiety disorder Mother     Hypertension Mother     Depression Mother     Osteoarthritis Mother     Graves' disease Mother     Hypertension Father     Gout Father     Hyperlipidemia Father     Osteoarthritis Father     Ovarian cancer Maternal Grandmother          at 50    Cancer Maternal Grandmother     Esophageal cancer Maternal Grandfather     Stroke Paternal Grandmother     Deep vein thrombosis Paternal Grandfather     Pulmonary embolism Paternal Grandfather     Heart attack Paternal Grandfather     Ovarian cancer Maternal Aunt 50       Social History:  Social History     Socioeconomic History    Marital status: Single     Spouse name: Not on file    Number of children: Not on file    Years of education: Not on file    Highest education level: Not on file   Occupational History    Not on file   Tobacco Use    Smoking status: Never    Smokeless tobacco: Never   Vaping Use    Vaping status: Never Used   Substance and Sexual Activity    Alcohol use: Not Currently     Comment: occasional    Drug use: No    Sexual activity: Yes     Partners: Male     Birth control/protection: None   Other Topics Concern    Not on file   Social History Narrative    Not on file     Social Drivers of Health     Financial Resource Strain: Not on file   Food Insecurity: Not on file   Transportation Needs: Not on file  "  Physical Activity: Not on file   Stress: Not on file   Social Connections: Not on file   Intimate Partner Violence: Not on file   Housing Stability: Not on file     Presently lives with , kids and dog.  Patient is currently employed physical therapy.      Allergies[2]  Current Medications[3]      Review of Systems  Constitutional :no fever, feels well, no tiredness, no recent weight gain or loss  ENT: no ear ache, no loss of hearing, no nosebleeds or nasal discharge, no sore throat or hoarseness.  Cardiovascular: no complaints of slow or fast heart beat, no chest pain, no palpitations, no leg claudication or lower extremity edema.  Respiratory: no complaints of shortness of shortness of breath, no SANDY  Breasts:no complaints of breast pain, breast lump, or nipple discharge  Gastrointestinal: no complaints of abdominal pain, constipation, nausea, vomiting, or diarrhea or bloody stools  Genitourinary : no complaints of dysuria, incontinence, pelvic pain, no dysmenorrhea, vaginal discharge or abnormal vaginal bleeding and as noted in HPI.  Musculoskeletal: no complaints of arthralgia, no myalgia, no joint swelling or stiffness, no limb pain or swelling.  Integumentary: no complaints of skin rash or lesion, itching or dry skin  Neurological: no complaints of headache, no confusion, no numbness or tingling, no dizziness or fainting    Physical Exam:   /68   Ht 5' 5\" (1.651 m)   Wt 77.1 kg (169 lb 14.4 oz)   LMP 05/07/2025   Breastfeeding Yes   BMI 28.27 kg/m²     General: appears stated age, cooperative, alert normal mood and affect   Psychiatric oriented to person, place and time.  Mood and affect normal   Neck: normal, supple,trachea midline, no masses.  Thyroid: normal, no thyromegaly   Heart: regular rate and rhythm, S1, S2 normal, no murmur, click, rub or gallop   Lungs: clear to auscultation bilaterally, no increased work of breathing or signs of respiratory distress   Breasts: normal, no " dimpling or skin changes noted   Abdomen: soft, non-tender, without masses or organomegaly   Vulva: normal , no lesions, mild erythema along entire left labia, and lower portion of the right labia   Vagina: normal , no lesions or dryness   Urethra: normal   Urethal meatus normal   Bladder Normal, soft, non-tender and no prolapse or masses appreciated   Cervix: normal, no palpable masses    Uterus: normal , non-tender, not enlarged, no palpable masses   Adnexa: normal, non-tender without fullness or masses   Lymphatic Palpation of lymph nodes in neck, axilla, groin and/or other locations: no lymphadenopathy or masses noted   Skin Normal skin turgor and no rashes.  Palpation of skin and subcutaneous tissue normal.             [1]   Patient Active Problem List  Diagnosis    Overweight (BMI 25.0-29.9)    Seasonal allergies   [2] No Known Allergies  [3]   Current Outpatient Medications:     Prenatal MV-Min-Fe Fum-FA-DHA (PRENATAL+DHA PO), Take 2 tablets by mouth in the morning., Disp: , Rfl: